# Patient Record
Sex: MALE | Race: WHITE | URBAN - METROPOLITAN AREA
[De-identification: names, ages, dates, MRNs, and addresses within clinical notes are randomized per-mention and may not be internally consistent; named-entity substitution may affect disease eponyms.]

---

## 2023-02-21 ENCOUNTER — OFFICE VISIT (OUTPATIENT)
Dept: CARDIOLOGY CLINIC | Age: 68
End: 2023-02-21
Payer: COMMERCIAL

## 2023-02-21 VITALS
SYSTOLIC BLOOD PRESSURE: 106 MMHG | DIASTOLIC BLOOD PRESSURE: 72 MMHG | RESPIRATION RATE: 14 BRPM | HEART RATE: 64 BPM | BODY MASS INDEX: 30.35 KG/M2 | WEIGHT: 229 LBS | HEIGHT: 73 IN | OXYGEN SATURATION: 96 %

## 2023-02-21 DIAGNOSIS — I25.5 ISCHEMIC CARDIOMYOPATHY: ICD-10-CM

## 2023-02-21 DIAGNOSIS — I25.10 CORONARY ARTERY DISEASE INVOLVING NATIVE HEART, UNSPECIFIED VESSEL OR LESION TYPE, UNSPECIFIED WHETHER ANGINA PRESENT: ICD-10-CM

## 2023-02-21 DIAGNOSIS — I73.9 PAD (PERIPHERAL ARTERY DISEASE) (HCC): ICD-10-CM

## 2023-02-21 DIAGNOSIS — I50.9 CONGESTIVE HEART FAILURE, UNSPECIFIED HF CHRONICITY, UNSPECIFIED HEART FAILURE TYPE (HCC): Primary | ICD-10-CM

## 2023-02-21 DIAGNOSIS — I70.1 RENAL ARTERY STENOSIS (HCC): ICD-10-CM

## 2023-02-21 DIAGNOSIS — I47.20 V-TACH: ICD-10-CM

## 2023-02-21 DIAGNOSIS — Z95.810 BIVENTRICULAR ICD (IMPLANTABLE CARDIOVERTER-DEFIBRILLATOR) IN PLACE: ICD-10-CM

## 2023-02-21 PROCEDURE — 93005 ELECTROCARDIOGRAM TRACING: CPT | Performed by: INTERNAL MEDICINE

## 2023-02-21 PROCEDURE — G8432 DEP SCR NOT DOC, RNG: HCPCS | Performed by: INTERNAL MEDICINE

## 2023-02-21 PROCEDURE — 1101F PT FALLS ASSESS-DOCD LE1/YR: CPT | Performed by: INTERNAL MEDICINE

## 2023-02-21 PROCEDURE — 99205 OFFICE O/P NEW HI 60 MIN: CPT | Performed by: INTERNAL MEDICINE

## 2023-02-21 PROCEDURE — 93010 ELECTROCARDIOGRAM REPORT: CPT | Performed by: INTERNAL MEDICINE

## 2023-02-21 PROCEDURE — G8536 NO DOC ELDER MAL SCRN: HCPCS | Performed by: INTERNAL MEDICINE

## 2023-02-21 PROCEDURE — G0463 HOSPITAL OUTPT CLINIC VISIT: HCPCS | Performed by: INTERNAL MEDICINE

## 2023-02-21 PROCEDURE — 3017F COLORECTAL CA SCREEN DOC REV: CPT | Performed by: INTERNAL MEDICINE

## 2023-02-21 PROCEDURE — G8427 DOCREV CUR MEDS BY ELIG CLIN: HCPCS | Performed by: INTERNAL MEDICINE

## 2023-02-21 PROCEDURE — G8417 CALC BMI ABV UP PARAM F/U: HCPCS | Performed by: INTERNAL MEDICINE

## 2023-02-21 PROCEDURE — 1123F ACP DISCUSS/DSCN MKR DOCD: CPT | Performed by: INTERNAL MEDICINE

## 2023-02-21 RX ORDER — SACUBITRIL AND VALSARTAN 24; 26 MG/1; MG/1
1 TABLET, FILM COATED ORAL 2 TIMES DAILY
Qty: 56 TABLET | Refills: 0 | Status: SHIPPED | COMMUNITY
Start: 2023-02-21

## 2023-02-21 RX ORDER — NITROGLYCERIN 0.4 MG/1
TABLET SUBLINGUAL
COMMUNITY

## 2023-02-21 RX ORDER — CARVEDILOL 25 MG/1
25 TABLET ORAL 2 TIMES DAILY
COMMUNITY
Start: 2022-08-10

## 2023-02-21 RX ORDER — ROSUVASTATIN CALCIUM 40 MG/1
40 TABLET, COATED ORAL
COMMUNITY
Start: 2023-01-02

## 2023-02-21 RX ORDER — ASPIRIN 81 MG/1
81 TABLET ORAL DAILY
COMMUNITY
Start: 2022-03-14

## 2023-02-21 RX ORDER — PAROXETINE HYDROCHLORIDE 20 MG/1
20 TABLET, FILM COATED ORAL DAILY
COMMUNITY
Start: 2023-01-02

## 2023-02-21 RX ORDER — LISINOPRIL 20 MG/1
20 TABLET ORAL DAILY
COMMUNITY
Start: 2023-01-19 | End: 2023-02-21

## 2023-02-21 NOTE — PROGRESS NOTES
Room A 9      Chief Complaint   Patient presents with    CHF    Cardiomyopathy     Visit Vitals  /72 (BP 1 Location: Left upper arm, BP Patient Position: Sitting, BP Cuff Size: Large adult)   Pulse 64   Resp 14   Ht 6' 1\" (1.854 m)   Wt 229 lb (103.9 kg)   SpO2 96%   BMI 30.21 kg/m²     Has SJM BiV ICD, would like to establish with our device clinic    Chest pain: no    Shortness of breath: no    Edema: no    Palpitations, Skipped beats, Rapid heartbeat: no    Dizziness: no    Fatigue:no    New diagnosis/Surgeries: no    1. Have you been to the ER, urgent care clinic since your last visit? Hospitalized since your last visit? NO      2. Have you seen or consulted any other health care providers outside of the [address] [place] [address] since your last visit? Include any pap smears or colon screening.  NO     Refills: NO

## 2023-02-21 NOTE — PROGRESS NOTES
Marlin Rowan MD, MS, [address] [facility]            HISTORY OF PRESENT ILLNESS:    Nava Carranza is a 79 y.o. male here to establish care. Moved to [address] from [place]. Previously followed by Dr. Huy Alaniz,      Coronary artery disease  1994 non-STEMI  2000 CABG LIMA LAD, SVG RCA, SVG OMB  2008? LHC: LIMA to LAD occluded, stent to LAD and CX. (No report available)  10/2017 nuc stress: EF 38%, large partially rev infapical defect c/w MI, mild giacomo-infarct ischemia  12/2018 admission with chest discomfort and ventricular tachycardia  12/2018 LHC: LM nl, LAD 40%, CX irreg, OM1 70% small vessel, %,   LIMA to LAD occluded, SVG %, SVG %. Medical mgnt  2/2020 nuclear stress test: Large fixed defect LAD/RCA, EF 25%. No ischemia  doing well without angina. Continue medical management. Cardiomyopathy  prior EF 20%. No echoes or recent assessment of LV function. 10/2017 Echo: EF 30%, infbasal AK, infseptal HK. PA 21 mmHg  12/2018 Echo: EF 30%, septal thinning, no valvular disease, LVE   2/2020 nuclear stress test: EF 25%  2/2020 echo: EF 30%, inf and apical AK, apical thrombus, CONSTANTIN, no valvular dz  8/2021 echo: EF 27%, apical inferolateral akinesis, no thrombus  doing well without signs or symptoms of CHF. Had been on carvedilol and lisinopril will stop lisinopril and start entresto. Ventricular tachycardia  Previously on amiodarone and mexiletine, stopped 2018 with admission 12/2018 ventricular tachycardia. Mexiletine resumed, ICD parameters changed  2/2020 ventricular tachycardia storm  7/2020 ventricular tachycardia ablation  doing well, asymptomatic, on mexiletine and amiodarone  he would like to continue mexiletine.   Followed by Dr. Angie Pete  Off mexiletine     ICD implant   Initial implant 2008, biventricular ICD 2017  currently doing well     hypertension,   controlled on medications     hyperlipidemia,   stable on crestor 20 mg      Apical thrombus   2/2020 echo: Apical thrombus  8/20/2021 echo: No thrombus  patient seems to be doing well with resolution. Will stop Eliquis and start aspirin. PAD  2008 left iliac stent  2011 right iliac stent  doing well with mild claudication    DOLORES  right renal stent 2006 1/2020 renal duplex: No stenosis  currently doing well with normal blood pressure. Mild renal insufficiency  renal insufficiency with creatinine 1.5    COPD       Total time spent >35 min, reviewing my prior notes, referring physician notes, other subspecialty and primary care notes, all available lab values, all available cardiac testing, all available radiology imaging, vital signs, weights, medications, potential medication interactions, family history, preparing my notes, updating diagnoses, correcting chart errors from other providers, documenting the above, discussing findings/results/testing methodologies/plan with patient, ordering tests/lab, sending prescriptions. SUMMARY:   Problem List  Date Reviewed: 2/21/2023            Codes Class Noted    PAD (peripheral artery disease) ([facility] ICD-10-CM: I73.9  ICD-9-CM: 443.9  2/21/2023        Renal artery stenosis (HCC) ICD-10-CM: I70.1  ICD-9-CM: 440.1  2/21/2023        Coronary artery disease involving native heart ICD-10-CM: I25.10  ICD-9-CM: 414.01  2/21/2023        Congestive heart failure ([facility] ICD-10-CM: I50.9  ICD-9-CM: 428.0  2/21/2023        V-tach ICD-10-CM: A00.77  ICD-9-CM: 427.1  2/21/2023        Biventricular ICD (implantable cardioverter-defibrillator) in place ICD-10-CM: Z95.810  ICD-9-CM: V45.02  2/21/2023        Ischemic cardiomyopathy ICD-10-CM: I25.5  ICD-9-CM: 414.8  2/21/2023           Current Outpatient Medications on File Prior to Visit   Medication Sig    carvediloL (COREG) 25 mg tablet Take 25 mg by mouth two (2) times a day. aspirin delayed-release 81 mg tablet Take 81 mg by mouth daily. rosuvastatin (CRESTOR) 40 mg tablet Take 40 mg by mouth nightly.     nitroglycerin (NITROSTAT) 0.4 mg SL tablet     PARoxetine (PAXIL) 20 mg tablet Take 20 mg by mouth daily. No current facility-administered medications on file prior to visit. CARDIOLOGY STUDIES TO DATE:  No results found for this visit on 02/21/23. CARDIAC ROS:   negative    Past Medical History:   Diagnosis Date    Carotid artery disease ([address]     ICD (implantable cardioverter-defibrillator) in place        Family History   Problem Relation Age of Onset    Coronary Art Dis Mother     Coronary Art Dis Father     Parkinson's Disease Brother        Social History     Socioeconomic History    Marital status:      Spouse name: Not on file    Number of children: Not on file    Years of education: Not on file    Highest education level: Not on file   Occupational History    Not on file   Tobacco Use    Smoking status: Every Day     Packs/day: 0.50     Types: Cigarettes    Smokeless tobacco: Never   Substance and Sexual Activity    Alcohol use: Yes     Comment: socially    Drug use: Not on file    Sexual activity: Not on file   Other Topics Concern    Not on file   Social History Narrative    Not on file     Social Determinants of Health     Financial Resource Strain: Not on file   Food Insecurity: Not on file   Transportation Needs: Not on file   Physical Activity: Not on file   Stress: Not on file   Social Connections: Not on file   Intimate Partner Violence: Not on file   Housing Stability: Not on file        GENERAL ROS:  A comprehensive review of systems was negative except for that written in the HPI.     Visit Vitals  /72 (BP 1 Location: Left upper arm, BP Patient Position: Sitting, BP Cuff Size: Large adult)   Pulse 64   Resp 14   Ht 6' 1\" (1.854 m)   Wt 103.9 kg (229 lb)   SpO2 96%   BMI 30.21 kg/m²     Vitals:    02/21/23 1044   BP: 106/72   Pulse: 64   Resp: 14   SpO2: 96%   Weight: 103.9 kg (229 lb)   Height: 6' 1\" (1.854 m)        Wt Readings from Last 3 Encounters:   02/21/23 103.9 kg (229 lb) BP Readings from Last 3 Encounters:   02/21/23 106/72       PHYSICAL EXAM  General appearance: alert, cooperative, no distress, appears stated age  Neck: supple, symmetrical, trachea midline, no adenopathy, thyroid: not enlarged, symmetric, no tenderness/mass/nodules, no carotid bruit, and no JVD  Lungs: clear to auscultation bilaterally  Heart: regular rate and rhythm, S1, S2 normal, no murmur, click, rub or gallop  Extremities: extremities normal, atraumatic, no cyanosis or edema    No results found for: CHOL, CHOLX, CHLST, CHOLV, 020972, HDL, HDLP, LDL, LDLC, DLDLP, TGLX, TRIGL, TRIGP, CHHD, CHHDX    No results found for: WBC, WBCLT, HGBPOC, HGB, HGBP, HCTPOC, HCT, PHCT, RBCH, PLT, MCV, HGBEXT, HCTEXT, PLTEXT, HGBEXT, HCTEXT, PLTEXT     No results found for: CHOL, CHOLPOCT, CHOLX, CHLST, CHOLV, HDL, HDLP, LDL, LDLC, DLDLP, TGLX, TRIGL, TRIGP, TGLPOCT, NTGLT, CHHD, CHHDX     ASSESSMENT  Diagnoses and all orders for this visit:    1. Congestive heart failure, unspecified HF chronicity, unspecified heart failure type (HCC)  -     AMB POC EKG ROUTINE W/ 12 LEADS, INTER & REP  -     CBC W/O DIFF; Future  -     METABOLIC PANEL, COMPREHENSIVE; Future  -     LIPID PANEL; Future  -     TSH 3RD GENERATION; Future  -     ECHO ADULT COMPLETE; Future  -     NUCLEAR CARDIAC STRESS TEST; Future    2. Coronary artery disease involving native heart, unspecified vessel or lesion type, unspecified whether angina present  -     CBC W/O DIFF; Future  -     METABOLIC PANEL, COMPREHENSIVE; Future  -     LIPID PANEL; Future  -     TSH 3RD GENERATION; Future  -     ECHO ADULT COMPLETE; Future  -     NUCLEAR CARDIAC STRESS TEST; Future    3. V-tach  -     CBC W/O DIFF; Future  -     METABOLIC PANEL, COMPREHENSIVE; Future  -     LIPID PANEL; Future  -     TSH 3RD GENERATION; Future  -     ECHO ADULT COMPLETE; Future  -     NUCLEAR CARDIAC STRESS TEST; Future    4.  Biventricular ICD (implantable cardioverter-defibrillator) in place  - CBC W/O DIFF; Future  -     METABOLIC PANEL, COMPREHENSIVE; Future  -     LIPID PANEL; Future  -     TSH 3RD GENERATION; Future  -     ECHO ADULT COMPLETE; Future  -     NUCLEAR CARDIAC STRESS TEST; Future    5. PAD (peripheral artery disease) (HCC)  -     CBC W/O DIFF; Future  -     METABOLIC PANEL, COMPREHENSIVE; Future  -     LIPID PANEL; Future  -     TSH 3RD GENERATION; Future  -     ECHO ADULT COMPLETE; Future  -     NUCLEAR CARDIAC STRESS TEST; Future    6. Renal artery stenosis (HCC)  -     CBC W/O DIFF; Future  -     METABOLIC PANEL, COMPREHENSIVE; Future  -     LIPID PANEL; Future  -     TSH 3RD GENERATION; Future  -     ECHO ADULT COMPLETE; Future  -     NUCLEAR CARDIAC STRESS TEST; Future    7. Ischemic cardiomyopathy  -     CBC W/O DIFF; Future  -     METABOLIC PANEL, COMPREHENSIVE; Future  -     LIPID PANEL; Future  -     TSH 3RD GENERATION; Future  -     ECHO ADULT COMPLETE; Future  -     NUCLEAR CARDIAC STRESS TEST; Future    Other orders  -     sacubitriL-valsartan (Entresto) 24-26 mg tablet; Take 1 Tablet by mouth two (2) times a day. -     sacubitril-valsartan (ENTRESTO) 24 mg/26 mg tablet; Take 1 Tablet by mouth two (2) times a day. Encounter Diagnoses   Name Primary?     Congestive heart failure, unspecified HF chronicity, unspecified heart failure type ([facility] Yes    Coronary artery disease involving native heart, unspecified vessel or lesion type, unspecified whether angina present     V-tach     Biventricular ICD (implantable cardioverter-defibrillator) in place     PAD (peripheral artery disease) ([facility]     Renal artery stenosis (HCC)     Ischemic cardiomyopathy      Orders Placed This Encounter    CBC W/O DIFF    METABOLIC PANEL, COMPREHENSIVE    LIPID PANEL    TSH 3RD GENERATION    AMB POC EKG ROUTINE W/ 12 LEADS, INTER & REP    DISCONTD: lisinopriL (PRINIVIL, ZESTRIL) 20 mg tablet    carvediloL (COREG) 25 mg tablet    aspirin delayed-release 81 mg tablet    rosuvastatin (CRESTOR) 40 mg tablet    nitroglycerin (NITROSTAT) 0.4 mg SL tablet    PARoxetine (PAXIL) 20 mg tablet    sacubitriL-valsartan (Entresto) 24-26 mg tablet    sacubitril-valsartan (ENTRESTO) 24 mg/26 mg tablet       Plan      Follow-up and Dispositions    Return in about 3 months (around 5/21/2023). Jam Hedrick MD  2/21/2023        [address]   [address]  [facility], [address] [place] [address] Avenue  [phone] (F)    [address] [facility]  [address]  [phone] (P)  [phone] (F)    ATTENTION:   This medical record was transcribed using an electronic medical records/speech recognition system. Although proofread, it may and can contain electronic, spelling and other errors. Corrections may be executed at a later time. Please feel free to contact us for any clarifications as needed.

## 2023-02-21 NOTE — PROGRESS NOTES
Per Dr. Belinda Ragsdale- echo, exercise nuclear stress now and 3 follow up. Labs ordered. Stop Lisinopril now; start Entresto Thursday (one day washout period) Per Dr. Belinda Ragsdale- Patient expressed understanding.

## 2023-03-13 NOTE — TELEPHONE ENCOUNTER
Pt is calling because he needs a refill on his carvedilol. Patient is completely out of his medicine. pharmacy is confirmed.       [phone]

## 2023-03-14 RX ORDER — CARVEDILOL 25 MG/1
25 TABLET ORAL 2 TIMES DAILY
Qty: 180 TABLET | Refills: 3 | Status: SHIPPED | OUTPATIENT
Start: 2023-03-14

## 2023-03-14 NOTE — TELEPHONE ENCOUNTER
Refill per VO of Dr. Deshpande Pap  Last appt: 2/21/2023  Future Appointments   Date Time Provider Shilpa Donaldsoni   3/15/2023  8:00 AM LONA PABLO BS AMB   3/15/2023  9:30 AM LONA WEAVER AMB   3/15/2023  1:40 PM Mikayla Meyer MD CAV BS AMB       Requested Prescriptions     Signed Prescriptions Disp Refills    carvediloL (COREG) 25 mg tablet 180 Tablet 3     Sig: Take 1 Tablet by mouth two (2) times a day.      Authorizing Provider: Lina Mosquera     Ordering User: Carolyn Gaspar

## 2023-03-15 ENCOUNTER — ANCILLARY PROCEDURE (OUTPATIENT)
Dept: CARDIOLOGY CLINIC | Age: 68
End: 2023-03-15
Payer: MEDICARE

## 2023-03-15 ENCOUNTER — OFFICE VISIT (OUTPATIENT)
Dept: CARDIOLOGY CLINIC | Age: 68
End: 2023-03-15
Payer: MEDICARE

## 2023-03-15 VITALS
WEIGHT: 229 LBS | HEIGHT: 73 IN | SYSTOLIC BLOOD PRESSURE: 120 MMHG | OXYGEN SATURATION: 97 % | BODY MASS INDEX: 30.35 KG/M2 | HEART RATE: 64 BPM | DIASTOLIC BLOOD PRESSURE: 80 MMHG

## 2023-03-15 VITALS — WEIGHT: 229 LBS | HEIGHT: 73 IN | BODY MASS INDEX: 30.35 KG/M2

## 2023-03-15 VITALS
DIASTOLIC BLOOD PRESSURE: 72 MMHG | WEIGHT: 221 LBS | BODY MASS INDEX: 29.29 KG/M2 | SYSTOLIC BLOOD PRESSURE: 106 MMHG | HEIGHT: 73 IN

## 2023-03-15 DIAGNOSIS — I25.10 CORONARY ARTERY DISEASE INVOLVING NATIVE HEART, UNSPECIFIED VESSEL OR LESION TYPE, UNSPECIFIED WHETHER ANGINA PRESENT: ICD-10-CM

## 2023-03-15 DIAGNOSIS — I25.5 ISCHEMIC CARDIOMYOPATHY: ICD-10-CM

## 2023-03-15 DIAGNOSIS — I47.20 V-TACH: ICD-10-CM

## 2023-03-15 DIAGNOSIS — I73.9 PAD (PERIPHERAL ARTERY DISEASE) (HCC): ICD-10-CM

## 2023-03-15 DIAGNOSIS — I50.9 CONGESTIVE HEART FAILURE, UNSPECIFIED HF CHRONICITY, UNSPECIFIED HEART FAILURE TYPE (HCC): ICD-10-CM

## 2023-03-15 DIAGNOSIS — I47.20 V-TACH (HCC): ICD-10-CM

## 2023-03-15 DIAGNOSIS — Z95.810 BIVENTRICULAR ICD (IMPLANTABLE CARDIOVERTER-DEFIBRILLATOR) IN PLACE: ICD-10-CM

## 2023-03-15 DIAGNOSIS — I70.1 RENAL ARTERY STENOSIS (HCC): ICD-10-CM

## 2023-03-15 DIAGNOSIS — Z95.810 BIVENTRICULAR ICD (IMPLANTABLE CARDIOVERTER-DEFIBRILLATOR) IN PLACE: Primary | ICD-10-CM

## 2023-03-15 LAB
ECHO AO ASC DIAM: 3.2 CM
ECHO AO ASCENDING AORTA INDEX: 1.4 CM/M2
ECHO AO ROOT DIAM: 3.9 CM
ECHO AO ROOT INDEX: 1.71 CM/M2
ECHO AV PEAK GRADIENT: 3 MMHG
ECHO AV PEAK VELOCITY: 0.8 M/S
ECHO AV VELOCITY RATIO: 0.75
ECHO LA DIAMETER INDEX: 1.67 CM/M2
ECHO LA DIAMETER: 3.8 CM
ECHO LA TO AORTIC ROOT RATIO: 0.97
ECHO LA VOL 2C: 60 ML (ref 18–58)
ECHO LA VOL 4C: 56 ML (ref 18–58)
ECHO LA VOL BP: 60 ML (ref 18–58)
ECHO LA VOL/BSA BIPLANE: 26 ML/M2 (ref 16–34)
ECHO LA VOLUME AREA LENGTH: 62 ML
ECHO LA VOLUME INDEX A2C: 26 ML/M2 (ref 16–34)
ECHO LA VOLUME INDEX A4C: 25 ML/M2 (ref 16–34)
ECHO LA VOLUME INDEX AREA LENGTH: 27 ML/M2 (ref 16–34)
ECHO LV E' LATERAL VELOCITY: 5 CM/S
ECHO LV E' SEPTAL VELOCITY: 4 CM/S
ECHO LV EDV A2C: 157 ML
ECHO LV EDV A4C: 232 ML
ECHO LV EDV BP: 193 ML (ref 67–155)
ECHO LV EDV INDEX A4C: 102 ML/M2
ECHO LV EDV INDEX BP: 85 ML/M2
ECHO LV EDV NDEX A2C: 69 ML/M2
ECHO LV EJECTION FRACTION A2C: 39 %
ECHO LV EJECTION FRACTION A4C: 29 %
ECHO LV EJECTION FRACTION BIPLANE: 31 % (ref 55–100)
ECHO LV ESV A2C: 96 ML
ECHO LV ESV A4C: 164 ML
ECHO LV ESV BP: 133 ML (ref 22–58)
ECHO LV ESV INDEX A2C: 42 ML/M2
ECHO LV ESV INDEX A4C: 72 ML/M2
ECHO LV ESV INDEX BP: 58 ML/M2
ECHO LV FRACTIONAL SHORTENING: 9 % (ref 28–44)
ECHO LV INTERNAL DIMENSION DIASTOLE INDEX: 2.46 CM/M2
ECHO LV INTERNAL DIMENSION DIASTOLIC: 5.6 CM (ref 4.2–5.9)
ECHO LV INTERNAL DIMENSION SYSTOLIC INDEX: 2.24 CM/M2
ECHO LV INTERNAL DIMENSION SYSTOLIC: 5.1 CM
ECHO LV IVSD: 1.9 CM (ref 0.6–1)
ECHO LV MASS 2D: 461 G (ref 88–224)
ECHO LV MASS INDEX 2D: 202.2 G/M2 (ref 49–115)
ECHO LV POSTERIOR WALL DIASTOLIC: 1.5 CM (ref 0.6–1)
ECHO LV RELATIVE WALL THICKNESS RATIO: 0.54
ECHO LVOT PEAK GRADIENT: 2 MMHG
ECHO LVOT PEAK VELOCITY: 0.6 M/S
ECHO MV A VELOCITY: 0.89 M/S
ECHO MV AREA PHT: 2.4 CM2
ECHO MV E DECELERATION TIME (DT): 320.1 MS
ECHO MV E VELOCITY: 0.61 M/S
ECHO MV E/A RATIO: 0.69
ECHO MV E/E' LATERAL: 12.2
ECHO MV E/E' RATIO (AVERAGED): 13.73
ECHO MV E/E' SEPTAL: 15.25
ECHO MV PRESSURE HALF TIME (PHT): 92.8 MS
ECHO RA AREA 4C: 21.8 CM2
ECHO RA END SYSTOLIC VOLUME APICAL 4 CHAMBER INDEX BSA: 30 ML/M2
ECHO RA VOLUME AREA LENGTH APICAL 4 CHAMBER: 75 ML
ECHO RA VOLUME: 68 ML
ECHO RV FREE WALL PEAK S': 7 CM/S
ECHO RV INTERNAL DIMENSION: 3.5 CM
ECHO RV TAPSE: 2 CM (ref 1.7–?)
ECHO TV REGURGITANT MAX VELOCITY: 1.89 M/S
ECHO TV REGURGITANT PEAK GRADIENT: 14 MMHG
NUC STRESS EJECTION FRACTION: 18 %
STRESS BASELINE DIAS BP: 82 MMHG
STRESS BASELINE HR: 60 BPM
STRESS BASELINE SYS BP: 126 MMHG
STRESS O2 SAT PEAK: 99 %
STRESS O2 SAT REST: 99 %
STRESS PEAK DIAS BP: 62 MMHG
STRESS PEAK SYS BP: 90 MMHG
STRESS PERCENT HR ACHIEVED: 44 %
STRESS POST PEAK HR: 68 BPM
STRESS RATE PRESSURE PRODUCT: 6120 BPM*MMHG
STRESS TARGET HR: 153 BPM
TID: 1.13

## 2023-03-15 PROCEDURE — 93017 CV STRESS TEST TRACING ONLY: CPT | Performed by: INTERNAL MEDICINE

## 2023-03-15 PROCEDURE — G0463 HOSPITAL OUTPT CLINIC VISIT: HCPCS | Performed by: INTERNAL MEDICINE

## 2023-03-15 PROCEDURE — 93016 CV STRESS TEST SUPVJ ONLY: CPT | Performed by: INTERNAL MEDICINE

## 2023-03-15 PROCEDURE — 78452 HT MUSCLE IMAGE SPECT MULT: CPT | Performed by: INTERNAL MEDICINE

## 2023-03-15 PROCEDURE — A9500 TC99M SESTAMIBI: HCPCS | Performed by: INTERNAL MEDICINE

## 2023-03-15 PROCEDURE — 93018 CV STRESS TEST I&R ONLY: CPT | Performed by: INTERNAL MEDICINE

## 2023-03-15 PROCEDURE — 93306 TTE W/DOPPLER COMPLETE: CPT | Performed by: INTERNAL MEDICINE

## 2023-03-15 RX ORDER — TETRAKIS(2-METHOXYISOBUTYLISOCYANIDE)COPPER(I) TETRAFLUOROBORATE 1 MG/ML
25.3 INJECTION, POWDER, LYOPHILIZED, FOR SOLUTION INTRAVENOUS ONCE
Status: COMPLETED | OUTPATIENT
Start: 2023-03-15 | End: 2023-03-15

## 2023-03-15 RX ORDER — TETRAKIS(2-METHOXYISOBUTYLISOCYANIDE)COPPER(I) TETRAFLUOROBORATE 1 MG/ML
8.7 INJECTION, POWDER, LYOPHILIZED, FOR SOLUTION INTRAVENOUS ONCE
Status: COMPLETED | OUTPATIENT
Start: 2023-03-15 | End: 2023-03-15

## 2023-03-15 RX ADMIN — REGADENOSON 0.4 MG: 0.08 INJECTION, SOLUTION INTRAVENOUS at 10:38

## 2023-03-15 RX ADMIN — TECHNETIUM TC 99M SESTAMIBI 25.3 MILLICURIE: 1 INJECTION, POWDER, FOR SOLUTION INTRAVENOUS at 10:30

## 2023-03-15 RX ADMIN — TECHNETIUM TC 99M SESTAMIBI 8.7 MILLICURIE: 1 INJECTION, POWDER, FOR SOLUTION INTRAVENOUS at 09:15

## 2023-03-15 NOTE — PROGRESS NOTES
Karissa Garrido MD, MS, [address] [facility]            HISTORY OF PRESENT ILLNESS:    Tamiko Ellis is a 79 y.o. male here to for FU. Moved to [address] from [place]. Previously followed by Dr. Femi Vieira. Echo and pharmacologic nuc stress done prior to his appt - EF 20-25%, inferior apical AK. No thrombus. Nuc stress large fixed inferior apical defect with no reversibility, EF 18%. Coronary artery disease  1994 non-STEMI  2000 CABG LIMA LAD, SVG RCA, SVG OMB  2008? LHC: LIMA to LAD occluded, stent to LAD and CX. (No report available)  10/2017 nuc stress: EF 38%, large partially rev infapical defect c/w MI, mild giacomo-infarct ischemia  12/2018 admission with chest discomfort and ventricular tachycardia  12/2018 LHC: LM nl, LAD 40%, CX irreg, OM1 70% small vessel, %,   LIMA to LAD occluded, SVG %, SVG %. Medical mgnt  2/2020 nuclear stress test: Large fixed defect LAD/RCA, EF 25%. No ischemia  doing well without angina. Continue medical management. 3/2023 pharma nuc arge fixed inferior apical defect with no reversibility, EF 18%. Cardiomyopathy  prior EF 20%. No echoes or recent assessment of LV function. 10/2017 Echo: EF 30%, infbasal AK, infseptal HK. PA 21 mmHg  12/2018 Echo: EF 30%, septal thinning, no valvular disease, LVE   2/2020 nuclear stress test: EF 25%  2/2020 echo: EF 30%, inf and apical AK, apical thrombus, CONSTANTIN, no valvular dz  8/2021 echo: EF 27%, apical inferolateral akinesis, no thrombus  doing well without signs or symptoms of CHF. Had been on carvedilol and lisinopril will stop lisinopril and start entresto. 3/2023 EF 20-25%, inferior apical AK. No thrombus. Ventricular tachycardia  Previously on amiodarone and mexiletine, stopped 2018 with admission 12/2018 ventricular tachycardia.  Mexiletine resumed, ICD parameters changed  2/2020 ventricular tachycardia storm  7/2020 ventricular tachycardia ablation  doing well, asymptomatic, on mexiletine and amiodarone  he would like to continue mexiletine. Followed by Dr. Anna Palma  Off mexiletine     ICD implant   Initial implant 2008, biventricular ICD 2017  currently doing well     hypertension,   controlled on medications     hyperlipidemia,   stable on crestor 20 mg      Apical thrombus   2/2020 echo: Apical thrombus  8/20/2021 echo: No thrombus  patient seems to be doing well with resolution. Will stop Eliquis and start aspirin. PAD  2008 left iliac stent  2011 right iliac stent  doing well with mild claudication    DOLORES  right renal stent 2006 1/2020 renal duplex: No stenosis  currently doing well with normal blood pressure. Mild renal insufficiency  renal insufficiency with creatinine 1.5    COPD      REFER TO DEVICE CLINIC, PCP. SUMMARY:   Problem List  Date Reviewed: 3/15/2023            Codes Class Noted    PAD (peripheral artery disease) ([facility] ICD-10-CM: I73.9  ICD-9-CM: 443.9  2/21/2023        Renal artery stenosis (HCC) ICD-10-CM: I70.1  ICD-9-CM: 440.1  2/21/2023        Coronary artery disease involving native heart ICD-10-CM: I25.10  ICD-9-CM: 414.01  2/21/2023        Congestive heart failure ([facility] ICD-10-CM: I50.9  ICD-9-CM: 428.0  2/21/2023        V-tach ICD-10-CM: H46.59  ICD-9-CM: 427.1  2/21/2023        Biventricular ICD (implantable cardioverter-defibrillator) in place ICD-10-CM: Z95.810  ICD-9-CM: V45.02  2/21/2023        Ischemic cardiomyopathy ICD-10-CM: I25.5  ICD-9-CM: 414.8  2/21/2023           Current Outpatient Medications on File Prior to Visit   Medication Sig    carvediloL (COREG) 25 mg tablet Take 1 Tablet by mouth two (2) times a day. aspirin delayed-release 81 mg tablet Take 81 mg by mouth daily. rosuvastatin (CRESTOR) 40 mg tablet Take 40 mg by mouth nightly. nitroglycerin (NITROSTAT) 0.4 mg SL tablet     PARoxetine (PAXIL) 20 mg tablet Take 20 mg by mouth daily. sacubitril-valsartan (ENTRESTO) 24 mg/26 mg tablet Take 1 Tablet by mouth two (2) times a day. Current Facility-Administered Medications on File Prior to Visit   Medication    [COMPLETED] regadenoson (LEXISCAN) injection 0.4 mg    [COMPLETED] technetium sestamibi TC 99M (CARDIOLITE) injection 8.7 millicurie    [COMPLETED] technetium sestamibi TC 99M (CARDIOLITE) injection 46.7 millicurie            CARDIOLOGY STUDIES TO DATE:  No results found for this visit on 03/15/23. CARDIAC ROS:   negative    Past Medical History:   Diagnosis Date    Carotid artery disease ([facility]     ICD (implantable cardioverter-defibrillator) in place        Family History   Problem Relation Age of Onset    Coronary Art Dis Mother     Coronary Art Dis Father     Parkinson's Disease Brother        Social History     Socioeconomic History    Marital status:      Spouse name: Not on file    Number of children: Not on file    Years of education: Not on file    Highest education level: Not on file   Occupational History    Not on file   Tobacco Use    Smoking status: Every Day     Packs/day: 0.50     Types: Cigarettes    Smokeless tobacco: Never   Substance and Sexual Activity    Alcohol use: Yes     Comment: socially    Drug use: Not on file    Sexual activity: Not on file   Other Topics Concern    Not on file   Social History Narrative    Not on file     Social Determinants of Health     Financial Resource Strain: Not on file   Food Insecurity: Not on file   Transportation Needs: Not on file   Physical Activity: Not on file   Stress: Not on file   Social Connections: Not on file   Intimate Partner Violence: Not on file   Housing Stability: Not on file        GENERAL ROS:  A comprehensive review of systems was negative except for that written in the HPI.     Visit Vitals  /80 (BP 1 Location: Left upper arm, BP Patient Position: Sitting, BP Cuff Size: Adult)   Pulse 64   Ht 6' 1\" (1.854 m)   Wt 103.9 kg (229 lb)   SpO2 97%   BMI 30.21 kg/m²     Vitals:    03/15/23 1327   BP: 120/80   Pulse: 64   SpO2: 97% Weight: 103.9 kg (229 lb)   Height: 6' 1\" (1.854 m)        Wt Readings from Last 3 Encounters:   03/15/23 103.9 kg (229 lb)   03/15/23 103.9 kg (229 lb)   03/15/23 100.2 kg (221 lb)            BP Readings from Last 3 Encounters:   03/15/23 120/80   03/15/23 106/72   02/21/23 106/72       PHYSICAL EXAM  General appearance: alert, cooperative, no distress, appears stated age  Neck: supple, symmetrical, trachea midline, no adenopathy, thyroid: not enlarged, symmetric, no tenderness/mass/nodules, no carotid bruit, and no JVD  Lungs: clear to auscultation bilaterally  Heart: regular rate and rhythm, S1, S2 normal, no murmur, click, rub or gallop  Extremities: extremities normal, atraumatic, no cyanosis or edema    Lab Results   Component Value Date/Time    Cholesterol, total 131 02/21/2023 11:44 AM    HDL Cholesterol 31 02/21/2023 11:44 AM    LDL, calculated 58.2 02/21/2023 11:44 AM    Triglyceride 209 (H) 02/21/2023 11:44 AM    CHOL/HDL Ratio 4.2 02/21/2023 11:44 AM       Lab Results   Component Value Date/Time    WBC 8.6 02/21/2023 11:44 AM    HGB 16.2 02/21/2023 11:44 AM    HCT 52.2 (H) 02/21/2023 11:44 AM    PLATELET  06/58/4468 11:44 AM     UNABLE TO REPORT ACCURATE COUNT DUE TO PLATELET AGGREGATION, HOWEVER, PLATELETS APPEAR DECREASED IN NUMBER ON SMEAR. PLEASE RESUBMIT SODIUM CITRATE (BLUE) AND EDTA (LAVENDAR) TUBES FOR HEMATOLOGICAL TESTING. MCV 98.1 02/21/2023 11:44 AM        Lab Results   Component Value Date/Time    Cholesterol, total 131 02/21/2023 11:44 AM    HDL Cholesterol 31 02/21/2023 11:44 AM    LDL, calculated 58.2 02/21/2023 11:44 AM    Triglyceride 209 (H) 02/21/2023 11:44 AM    CHOL/HDL Ratio 4.2 02/21/2023 11:44 AM        ASSESSMENT  Diagnoses and all orders for this visit:    1. Biventricular ICD (implantable cardioverter-defibrillator) in place    2. Ischemic cardiomyopathy    3. Congestive heart failure, unspecified HF chronicity, unspecified heart failure type ([facility]    4.  Renal artery stenosis ([facility]    5. PAD (peripheral artery disease) ([facility]    6. V-tach    7. Coronary artery disease involving native heart, unspecified vessel or lesion type, unspecified whether angina present         Encounter Diagnoses   Name Primary? Biventricular ICD (implantable cardioverter-defibrillator) in place Yes    Ischemic cardiomyopathy     Congestive heart failure, unspecified HF chronicity, unspecified heart failure type ([facility]     Renal artery stenosis (HCC)     PAD (peripheral artery disease) ([facility]     V-tach     Coronary artery disease involving native heart, unspecified vessel or lesion type, unspecified whether angina present        No orders of the defined types were placed in this encounter. Lucas Glez MD  3/15/2023        [address]   [address] [facility], [address] [place] [address]  [phone] (F)    [address]  [phone] (P)  [phone] (F)    ATTENTION:   This medical record was transcribed using an electronic medical records/speech recognition system. Although proofread, it may and can contain electronic, spelling and other errors. Corrections may be executed at a later time. Please feel free to contact us for any clarifications as needed.

## 2023-03-15 NOTE — PROGRESS NOTES
Sindy Mccallum is a 79 y.o. male    There were no vitals filed for this visit. Chief Complaint   Patient presents with    CHF    Coronary Artery Disease    Irregular Heart Beat     PAD    Cardiomyopathy    Other     TACHYCARDIA       Chest pain NO  SOB NO  Dizziness NO  Swelling NO  Recent hospital visit NO  Refills NITROGLYCERIN  COVID VACCINE YES  HAD COVID?  YES    PT WANTS TO TALK ABOUT ENTRESTO

## 2023-03-15 NOTE — PROGRESS NOTES
Per Dr. Lyndel Canavan- samples of entresto with 30 day free trail offer, patient assistance forms, EP new patient appt for ICD; and 3 month follow up with Lyndel Canavan.

## 2023-03-20 ENCOUNTER — OFFICE VISIT (OUTPATIENT)
Dept: CARDIOLOGY CLINIC | Age: 68
End: 2023-03-20
Payer: MEDICARE

## 2023-03-20 DIAGNOSIS — Z95.810 BIVENTRICULAR ICD (IMPLANTABLE CARDIOVERTER-DEFIBRILLATOR) IN PLACE: Primary | ICD-10-CM

## 2023-03-20 PROCEDURE — 93284 PRGRMG EVAL IMPLANTABLE DFB: CPT | Performed by: INTERNAL MEDICINE

## 2023-03-20 PROCEDURE — 93289 INTERROG DEVICE EVAL HEART: CPT | Performed by: INTERNAL MEDICINE

## 2023-03-20 NOTE — PROGRESS NOTES
C/ new to clinic pacer ck/thresholds  Device functioning appropriately as programmed. Atrial noise noted as well as some NSVT falling into VT-2 zone, no therapies initialed or delivered.    See scanned documents

## 2023-06-01 ENCOUNTER — TELEPHONE (OUTPATIENT)
Age: 68
End: 2023-06-01

## 2023-06-01 RX ORDER — ROSUVASTATIN CALCIUM 40 MG/1
40 TABLET, COATED ORAL NIGHTLY
Qty: 90 TABLET | Refills: 3 | Status: SHIPPED | OUTPATIENT
Start: 2023-06-01

## 2023-06-01 NOTE — TELEPHONE ENCOUNTER
Per verbal order from Dr. Chari Echeverria  Last appt: Visit date not found     Future Appointments   Date Time Provider Carlyle Aiken   6/2/2023  8:40 AM MD HERMES Alfonso AMB   6/19/2023  9:00 AM MD HERMES Youngblood AMB       Requested Prescriptions     Signed Prescriptions Disp Refills    rosuvastatin (CRESTOR) 40 MG tablet 90 tablet 3     Sig: Take 1 tablet by mouth nightly     Authorizing Provider: Toan Mckenzie     Ordering User: Heber Evans

## 2023-06-02 ENCOUNTER — OFFICE VISIT (OUTPATIENT)
Age: 68
End: 2023-06-02
Payer: MEDICARE

## 2023-06-02 VITALS
HEART RATE: 64 BPM | SYSTOLIC BLOOD PRESSURE: 100 MMHG | WEIGHT: 227 LBS | RESPIRATION RATE: 14 BRPM | OXYGEN SATURATION: 95 % | BODY MASS INDEX: 30.09 KG/M2 | DIASTOLIC BLOOD PRESSURE: 62 MMHG | HEIGHT: 73 IN

## 2023-06-02 DIAGNOSIS — I70.1 RENAL ARTERY STENOSIS (HCC): ICD-10-CM

## 2023-06-02 DIAGNOSIS — I50.22 CHRONIC SYSTOLIC CONGESTIVE HEART FAILURE (HCC): ICD-10-CM

## 2023-06-02 DIAGNOSIS — I25.5 ISCHEMIC CARDIOMYOPATHY: ICD-10-CM

## 2023-06-02 DIAGNOSIS — I73.9 PAD (PERIPHERAL ARTERY DISEASE) (HCC): ICD-10-CM

## 2023-06-02 DIAGNOSIS — Z95.810 BIVENTRICULAR ICD (IMPLANTABLE CARDIOVERTER-DEFIBRILLATOR) IN PLACE: ICD-10-CM

## 2023-06-02 DIAGNOSIS — I47.20 V-TACH (HCC): Primary | ICD-10-CM

## 2023-06-02 DIAGNOSIS — I25.10 CORONARY ARTERY DISEASE INVOLVING NATIVE HEART WITHOUT ANGINA PECTORIS, UNSPECIFIED VESSEL OR LESION TYPE: ICD-10-CM

## 2023-06-02 PROCEDURE — 99204 OFFICE O/P NEW MOD 45 MIN: CPT | Performed by: INTERNAL MEDICINE

## 2023-06-02 PROCEDURE — G8419 CALC BMI OUT NRM PARAM NOF/U: HCPCS | Performed by: INTERNAL MEDICINE

## 2023-06-02 PROCEDURE — 3017F COLORECTAL CA SCREEN DOC REV: CPT | Performed by: INTERNAL MEDICINE

## 2023-06-02 PROCEDURE — 4004F PT TOBACCO SCREEN RCVD TLK: CPT | Performed by: INTERNAL MEDICINE

## 2023-06-02 PROCEDURE — G8427 DOCREV CUR MEDS BY ELIG CLIN: HCPCS | Performed by: INTERNAL MEDICINE

## 2023-06-02 PROCEDURE — 1123F ACP DISCUSS/DSCN MKR DOCD: CPT | Performed by: INTERNAL MEDICINE

## 2023-06-02 NOTE — PROGRESS NOTES
Room #: EP 2    Chief Complaint   Patient presents with    Cardiomyopathy    Other     VT     SJM BiV ICD last check 5/31/23    /62 (Site: Left Upper Arm, Position: Sitting, Cuff Size: Large Adult)   Pulse 64   Resp 14   Ht 6' 1\" (1.854 m)   Wt 227 lb (103 kg)   SpO2 95%   BMI 29.95 kg/m²       Chest pain:  NO  Shortness of breath:  NO  Edema: NO  Palpitations, skipped beats, rapid heartbeat:  NO  Dizziness:  NO  Fatigue: NO     1. Have you been to the ER, urgent care clinic since your last visit? Hospitalized since your last visit? NO    2. Have you seen or consulted any other health care providers outside of the [address] [place] [address] since your last visit? Include any pap smears or colon screening.  NO      Refills:  NO

## 2023-06-02 NOTE — PROGRESS NOTES
Cardiac Electrophysiology OFFICE Consultation Note     Primary Cardiologist: Dr. Renny Suh    Assessment/Plan:   1. V-tach ([facility]  2. Biventricular ICD (implantable cardioverter-defibrillator) in place  3. Ischemic cardiomyopathy  4. Chronic systolic congestive heart failure ([facility]  5. Coronary artery disease involving native heart without angina pectoris, unspecified vessel or lesion type  6. Renal artery stenosis ([facility]  7. PAD (peripheral artery disease) (HCC)          Ventricular tachycardia  Previously on amiodarone and mexiletine, stopped  2018 with admission 12/2018 ventricular tachycardia. Mexiletine resumed, ICD parameters changed  2/2020 ventricular tachycardia storm  7/2020 ventricular tachycardia ablation  Previously on mexiletine and amiodarone  No recurrent sustained VT or ICD shock now off of AAD  - normal interrogation as noted below  - cont Carvedilol  - remote tranmission every 3 months  - monthly thoracic impedance transmission  - FU with EP NP in 1 year and with me in 18 months      ICD implant   Initial implant 2008, biventricular ICD 2017  [employer]. CRT-D with normal function. Battery life 2 years. No new or significant lead issues requiring intervention. No significant arrhythmias noted requiring intervention. Iterative programing was performed to assess lead parameters without any permanent changes. Effective Biventricular pacing 95%. AP 57%. AF burden <1%. Coronary artery disease  1994 non-STEMI  2000 CABG LIMA LAD, SVG RCA, SVG OMB  2008? LHC: LIMA to LAD occluded, stent to LAD and CX. (No report available)   10/2017 nuc stress: EF 38%, large partially rev infapical defect c/w MI, mild brady-infarct ischemia  12/2018 admission with chest discomfort and ventricular tachycardia  12/2018 LHC: LM nl, LAD 40%, CX irreg, OM1 70% small vessel, %,    LIMA to LAD occluded, SVG %, SVG %. Medical mgnt  2/2020 nuclear stress test: Large fixed defect LAD/RCA, EF 25%.   No

## 2023-06-02 NOTE — PATIENT INSTRUCTIONS
Schedule remote transmission every 3 months  Monthly [place] transmission  Fu with NP in 1 year  FU with Dr. Hall Persons 18 months

## 2023-06-15 ENCOUNTER — NURSE ONLY (OUTPATIENT)
Age: 68
End: 2023-06-15
Payer: MEDICARE

## 2023-06-15 DIAGNOSIS — Z95.810 BIVENTRICULAR ICD (IMPLANTABLE CARDIOVERTER-DEFIBRILLATOR) IN PLACE: Primary | ICD-10-CM

## 2023-06-15 PROCEDURE — 93296 REM INTERROG EVL PM/IDS: CPT | Performed by: INTERNAL MEDICINE

## 2023-06-15 PROCEDURE — PBSHW SINGLE,DUAL, MULTIPLE LEAD PACEMAKER SYST OR IMPL: Performed by: INTERNAL MEDICINE

## 2023-06-19 ENCOUNTER — OFFICE VISIT (OUTPATIENT)
Age: 68
End: 2023-06-19
Payer: MEDICARE

## 2023-06-19 VITALS
SYSTOLIC BLOOD PRESSURE: 134 MMHG | WEIGHT: 227 LBS | HEIGHT: 73 IN | DIASTOLIC BLOOD PRESSURE: 80 MMHG | BODY MASS INDEX: 30.09 KG/M2 | HEART RATE: 64 BPM | OXYGEN SATURATION: 95 %

## 2023-06-19 DIAGNOSIS — I25.10 CORONARY ARTERY DISEASE INVOLVING NATIVE HEART WITHOUT ANGINA PECTORIS, UNSPECIFIED VESSEL OR LESION TYPE: ICD-10-CM

## 2023-06-19 DIAGNOSIS — Z95.810 BIVENTRICULAR ICD (IMPLANTABLE CARDIOVERTER-DEFIBRILLATOR) IN PLACE: ICD-10-CM

## 2023-06-19 DIAGNOSIS — I50.22 CHRONIC SYSTOLIC CONGESTIVE HEART FAILURE (HCC): ICD-10-CM

## 2023-06-19 DIAGNOSIS — I70.1 RENAL ARTERY STENOSIS (HCC): ICD-10-CM

## 2023-06-19 DIAGNOSIS — I25.5 ISCHEMIC CARDIOMYOPATHY: Primary | ICD-10-CM

## 2023-06-19 DIAGNOSIS — I73.9 PAD (PERIPHERAL ARTERY DISEASE) (HCC): ICD-10-CM

## 2023-06-19 DIAGNOSIS — I47.20 V-TACH (HCC): ICD-10-CM

## 2023-06-19 PROCEDURE — 99214 OFFICE O/P EST MOD 30 MIN: CPT | Performed by: INTERNAL MEDICINE

## 2023-06-30 ASSESSMENT — ENCOUNTER SYMPTOMS
CHEST TIGHTNESS: 0
SHORTNESS OF BREATH: 0
WHEEZING: 0

## 2023-07-27 ENCOUNTER — APPOINTMENT (OUTPATIENT)
Dept: VASCULAR SURGERY | Facility: HOSPITAL | Age: 68
DRG: 176 | End: 2023-07-27
Payer: MEDICARE

## 2023-07-27 ENCOUNTER — APPOINTMENT (OUTPATIENT)
Facility: HOSPITAL | Age: 68
DRG: 176 | End: 2023-07-27
Payer: MEDICARE

## 2023-07-27 ENCOUNTER — HOSPITAL ENCOUNTER (OUTPATIENT)
Facility: HOSPITAL | Age: 68
Setting detail: OBSERVATION
LOS: 1 days | Discharge: HOME | DRG: 176 | End: 2023-07-28
Attending: STUDENT IN AN ORGANIZED HEALTH CARE EDUCATION/TRAINING PROGRAM | Admitting: STUDENT IN AN ORGANIZED HEALTH CARE EDUCATION/TRAINING PROGRAM
Payer: MEDICARE

## 2023-07-27 DIAGNOSIS — I51.7 CARDIOMEGALY: ICD-10-CM

## 2023-07-27 DIAGNOSIS — I82.451 DEEP VENOUS THROMBOSIS (DVT) OF RIGHT PERONEAL VEIN, UNSPECIFIED CHRONICITY (HCC): ICD-10-CM

## 2023-07-27 DIAGNOSIS — J98.11 MILD BIBASILAR ATELECTASIS: ICD-10-CM

## 2023-07-27 DIAGNOSIS — I26.99 PULMONARY EMBOLISM, UNSPECIFIED CHRONICITY, UNSPECIFIED PULMONARY EMBOLISM TYPE, UNSPECIFIED WHETHER ACUTE COR PULMONALE PRESENT (HCC): Primary | ICD-10-CM

## 2023-07-27 DIAGNOSIS — I26.99 PULMONARY EMBOLISM, BILATERAL (HCC): ICD-10-CM

## 2023-07-27 LAB
ALBUMIN SERPL-MCNC: 3.2 G/DL (ref 3.5–5)
ALBUMIN/GLOB SERPL: 0.7 (ref 1.1–2.2)
ALP SERPL-CCNC: 73 U/L (ref 45–117)
ALT SERPL-CCNC: 15 U/L (ref 12–78)
ANION GAP SERPL CALC-SCNC: 3 MMOL/L (ref 5–15)
AST SERPL-CCNC: 13 U/L (ref 15–37)
BASOPHILS # BLD: 0.1 K/UL (ref 0–0.1)
BASOPHILS NFR BLD: 1 % (ref 0–1)
BILIRUB SERPL-MCNC: 0.6 MG/DL (ref 0.2–1)
BUN SERPL-MCNC: 20 MG/DL (ref 6–20)
BUN/CREAT SERPL: 13 (ref 12–20)
CALCIUM SERPL-MCNC: 9.7 MG/DL (ref 8.5–10.1)
CHLORIDE SERPL-SCNC: 114 MMOL/L (ref 97–108)
CO2 SERPL-SCNC: 25 MMOL/L (ref 21–32)
COMMENT:: NORMAL
CREAT SERPL-MCNC: 1.57 MG/DL (ref 0.7–1.3)
D DIMER PPP FEU-MCNC: 12.08 MG/L FEU (ref 0–0.65)
DIFFERENTIAL METHOD BLD: NORMAL
EOSINOPHIL # BLD: 0.4 K/UL (ref 0–0.4)
EOSINOPHIL NFR BLD: 4 % (ref 0–7)
ERYTHROCYTE [DISTWIDTH] IN BLOOD BY AUTOMATED COUNT: 14.2 % (ref 11.5–14.5)
GLOBULIN SER CALC-MCNC: 4.3 G/DL (ref 2–4)
GLUCOSE SERPL-MCNC: 140 MG/DL (ref 65–100)
HCT VFR BLD AUTO: 45.3 % (ref 36.6–50.3)
HGB BLD-MCNC: 15 G/DL (ref 12.1–17)
IMM GRANULOCYTES # BLD AUTO: 0 K/UL (ref 0–0.04)
IMM GRANULOCYTES NFR BLD AUTO: 0 % (ref 0–0.5)
LYMPHOCYTES # BLD: 1.8 K/UL (ref 0.8–3.5)
LYMPHOCYTES NFR BLD: 18 % (ref 12–49)
MCH RBC QN AUTO: 31.3 PG (ref 26–34)
MCHC RBC AUTO-ENTMCNC: 33.1 G/DL (ref 30–36.5)
MCV RBC AUTO: 94.6 FL (ref 80–99)
MONOCYTES # BLD: 0.6 K/UL (ref 0–1)
MONOCYTES NFR BLD: 6 % (ref 5–13)
NEUTS SEG # BLD: 7.2 K/UL (ref 1.8–8)
NEUTS SEG NFR BLD: 71 % (ref 32–75)
NRBC # BLD: 0 K/UL (ref 0–0.01)
NRBC BLD-RTO: 0 PER 100 WBC
NT PRO BNP: 946 PG/ML
PLATELET # BLD AUTO: 166 K/UL (ref 150–400)
PMV BLD AUTO: 11.8 FL (ref 8.9–12.9)
POTASSIUM SERPL-SCNC: 3.8 MMOL/L (ref 3.5–5.1)
PROT SERPL-MCNC: 7.5 G/DL (ref 6.4–8.2)
RBC # BLD AUTO: 4.79 M/UL (ref 4.1–5.7)
SODIUM SERPL-SCNC: 142 MMOL/L (ref 136–145)
SPECIMEN HOLD: NORMAL
TROPONIN I SERPL HS-MCNC: 21 NG/L (ref 0–76)
TROPONIN I SERPL HS-MCNC: 27 NG/L (ref 0–76)
WBC # BLD AUTO: 10.2 K/UL (ref 4.1–11.1)

## 2023-07-27 PROCEDURE — G0378 HOSPITAL OBSERVATION PER HR: HCPCS

## 2023-07-27 PROCEDURE — 85025 COMPLETE CBC W/AUTO DIFF WBC: CPT

## 2023-07-27 PROCEDURE — 83880 ASSAY OF NATRIURETIC PEPTIDE: CPT

## 2023-07-27 PROCEDURE — 71275 CT ANGIOGRAPHY CHEST: CPT

## 2023-07-27 PROCEDURE — 1100000000 HC RM PRIVATE

## 2023-07-27 PROCEDURE — 93971 EXTREMITY STUDY: CPT

## 2023-07-27 PROCEDURE — 2580000003 HC RX 258: Performed by: STUDENT IN AN ORGANIZED HEALTH CARE EDUCATION/TRAINING PROGRAM

## 2023-07-27 PROCEDURE — 84484 ASSAY OF TROPONIN QUANT: CPT

## 2023-07-27 PROCEDURE — 36415 COLL VENOUS BLD VENIPUNCTURE: CPT

## 2023-07-27 PROCEDURE — 6360000004 HC RX CONTRAST MEDICATION

## 2023-07-27 PROCEDURE — 80053 COMPREHEN METABOLIC PANEL: CPT

## 2023-07-27 PROCEDURE — 99285 EMERGENCY DEPT VISIT HI MDM: CPT

## 2023-07-27 PROCEDURE — 85379 FIBRIN DEGRADATION QUANT: CPT

## 2023-07-27 PROCEDURE — 93005 ELECTROCARDIOGRAM TRACING: CPT | Performed by: HOSPITALIST

## 2023-07-27 PROCEDURE — 87635 SARS-COV-2 COVID-19 AMP PRB: CPT

## 2023-07-27 PROCEDURE — 6370000000 HC RX 637 (ALT 250 FOR IP): Performed by: STUDENT IN AN ORGANIZED HEALTH CARE EDUCATION/TRAINING PROGRAM

## 2023-07-27 PROCEDURE — 71046 X-RAY EXAM CHEST 2 VIEWS: CPT

## 2023-07-27 RX ORDER — TRAMADOL HYDROCHLORIDE 50 MG/1
50 TABLET ORAL EVERY 6 HOURS PRN
Status: DISCONTINUED | OUTPATIENT
Start: 2023-07-27 | End: 2023-07-28 | Stop reason: HOSPADM

## 2023-07-27 RX ORDER — ASPIRIN 81 MG/1
81 TABLET ORAL DAILY
Status: DISCONTINUED | OUTPATIENT
Start: 2023-07-28 | End: 2023-07-28 | Stop reason: HOSPADM

## 2023-07-27 RX ORDER — CARVEDILOL 12.5 MG/1
25 TABLET ORAL 2 TIMES DAILY WITH MEALS
Status: DISCONTINUED | OUTPATIENT
Start: 2023-07-27 | End: 2023-07-28 | Stop reason: HOSPADM

## 2023-07-27 RX ORDER — ONDANSETRON 2 MG/ML
4 INJECTION INTRAMUSCULAR; INTRAVENOUS EVERY 6 HOURS PRN
Status: DISCONTINUED | OUTPATIENT
Start: 2023-07-27 | End: 2023-07-28 | Stop reason: HOSPADM

## 2023-07-27 RX ORDER — ROSUVASTATIN CALCIUM 10 MG/1
40 TABLET, COATED ORAL NIGHTLY
Status: DISCONTINUED | OUTPATIENT
Start: 2023-07-27 | End: 2023-07-28 | Stop reason: HOSPADM

## 2023-07-27 RX ORDER — SODIUM CHLORIDE 0.9 % (FLUSH) 0.9 %
5-40 SYRINGE (ML) INJECTION EVERY 12 HOURS SCHEDULED
Status: DISCONTINUED | OUTPATIENT
Start: 2023-07-27 | End: 2023-07-28 | Stop reason: HOSPADM

## 2023-07-27 RX ORDER — SODIUM CHLORIDE 9 MG/ML
INJECTION, SOLUTION INTRAVENOUS PRN
Status: DISCONTINUED | OUTPATIENT
Start: 2023-07-27 | End: 2023-07-28 | Stop reason: HOSPADM

## 2023-07-27 RX ORDER — IBUPROFEN 200 MG
400 TABLET ORAL EVERY 6 HOURS PRN
Status: DISCONTINUED | OUTPATIENT
Start: 2023-07-27 | End: 2023-07-28 | Stop reason: HOSPADM

## 2023-07-27 RX ORDER — PAROXETINE HYDROCHLORIDE 20 MG/1
20 TABLET, FILM COATED ORAL DAILY
Status: DISCONTINUED | OUTPATIENT
Start: 2023-07-28 | End: 2023-07-28 | Stop reason: HOSPADM

## 2023-07-27 RX ORDER — ACETAMINOPHEN 650 MG/1
650 SUPPOSITORY RECTAL EVERY 6 HOURS PRN
Status: DISCONTINUED | OUTPATIENT
Start: 2023-07-27 | End: 2023-07-28 | Stop reason: HOSPADM

## 2023-07-27 RX ORDER — SODIUM CHLORIDE 0.9 % (FLUSH) 0.9 %
5-40 SYRINGE (ML) INJECTION PRN
Status: DISCONTINUED | OUTPATIENT
Start: 2023-07-27 | End: 2023-07-28 | Stop reason: HOSPADM

## 2023-07-27 RX ORDER — ONDANSETRON 4 MG/1
4 TABLET, ORALLY DISINTEGRATING ORAL EVERY 8 HOURS PRN
Status: DISCONTINUED | OUTPATIENT
Start: 2023-07-27 | End: 2023-07-28 | Stop reason: HOSPADM

## 2023-07-27 RX ORDER — ACETAMINOPHEN 325 MG/1
650 TABLET ORAL EVERY 6 HOURS PRN
Status: DISCONTINUED | OUTPATIENT
Start: 2023-07-27 | End: 2023-07-28 | Stop reason: HOSPADM

## 2023-07-27 RX ORDER — POLYETHYLENE GLYCOL 3350 17 G/17G
17 POWDER, FOR SOLUTION ORAL DAILY PRN
Status: DISCONTINUED | OUTPATIENT
Start: 2023-07-27 | End: 2023-07-28 | Stop reason: HOSPADM

## 2023-07-27 RX ADMIN — CARVEDILOL 25 MG: 12.5 TABLET, FILM COATED ORAL at 22:16

## 2023-07-27 RX ADMIN — SODIUM CHLORIDE, PRESERVATIVE FREE 10 ML: 5 INJECTION INTRAVENOUS at 23:33

## 2023-07-27 RX ADMIN — SACUBITRIL AND VALSARTAN 1 TABLET: 24; 26 TABLET, FILM COATED ORAL at 22:19

## 2023-07-27 RX ADMIN — TRAMADOL HYDROCHLORIDE 50 MG: 50 TABLET, COATED ORAL at 21:30

## 2023-07-27 RX ADMIN — APIXABAN 10 MG: 5 TABLET, FILM COATED ORAL at 21:30

## 2023-07-27 RX ADMIN — IOPAMIDOL 100 ML: 755 INJECTION, SOLUTION INTRAVENOUS at 18:09

## 2023-07-27 RX ADMIN — ROSUVASTATIN CALCIUM 40 MG: 10 TABLET, COATED ORAL at 21:30

## 2023-07-27 ASSESSMENT — ENCOUNTER SYMPTOMS
NAUSEA: 0
VOMITING: 0
SHORTNESS OF BREATH: 1
CONSTIPATION: 0
DIARRHEA: 0

## 2023-07-27 ASSESSMENT — PAIN SCALES - GENERAL: PAINLEVEL_OUTOF10: 7

## 2023-07-27 NOTE — ED PROVIDER NOTES
[facility] EMERGENCY DEPT  EMERGENCY DEPARTMENT ENCOUNTER      Pt Name: Primitivo Damon  MRN: [id number]  [address] [facility] 1955  Date of evaluation: 7/27/2023  Provider: Marciano Lopez PA-C    CHIEF COMPLAINT       Chief Complaint   Patient presents with    Shortness of Breath         HISTORY OF PRESENT ILLNESS   (Location/Symptom, Timing/Onset, Context/Setting, Quality, Duration, Modifying Factors, Severity)  Note limiting factors. HPI    Primitivo Damon is a 76 y.o. male with significant cardiac history who [facility] to [facility] with cc of right inspiratory chest pain. Patient reports since this morning, he is experienced 7 out of 10 sharp stabbing inspiratory pain on the right lateral chest.  No pain at baseline or with movement. Notes right calf pain over the last few days which has resolved. Notes recent travel to [place] with several hours in the car. Also notes recent exposure to somebody who was hospitalized with pneumonia. Denies fever, chills, nausea, vomiting, chest pain at baseline, cough, dyspnea at baseline, history of blood clots, trauma, injury, any other concerns at this time. PMH: PAD, renal artery stenosis, CHF, CAD, V. tach, biventricular ICD, ischemic cardiomyopathy, CABG    PCP: Pcp No    There are no other complaints, changes or physical findings at this time. Review of External Medical Records:     Nursing Notes were reviewed. REVIEW OF SYSTEMS    (2-9 systems for level 4, 10 or more for level 5)     Review of Systems   Constitutional:  Negative for chills and fever. HENT:  Negative for congestion. Respiratory:  Positive for shortness of breath. Cardiovascular:  Positive for chest pain. Gastrointestinal:  Negative for constipation, diarrhea, nausea and vomiting. Genitourinary:  Negative for dysuria and hematuria. Musculoskeletal:  Positive for myalgias. Skin:  Negative for rash. Neurological:  Negative for dizziness, light-headedness and headaches.

## 2023-07-28 ENCOUNTER — APPOINTMENT (OUTPATIENT)
Facility: HOSPITAL | Age: 68
DRG: 176 | End: 2023-07-28
Attending: STUDENT IN AN ORGANIZED HEALTH CARE EDUCATION/TRAINING PROGRAM
Payer: MEDICARE

## 2023-07-28 VITALS
HEIGHT: 74 IN | RESPIRATION RATE: 17 BRPM | OXYGEN SATURATION: 94 % | DIASTOLIC BLOOD PRESSURE: 70 MMHG | TEMPERATURE: 97.5 F | BODY MASS INDEX: 28.88 KG/M2 | WEIGHT: 225 LBS | SYSTOLIC BLOOD PRESSURE: 104 MMHG | HEART RATE: 60 BPM

## 2023-07-28 LAB
ALBUMIN SERPL-MCNC: 3.1 G/DL (ref 3.5–5)
ALBUMIN/GLOB SERPL: 0.7 (ref 1.1–2.2)
ALP SERPL-CCNC: 72 U/L (ref 45–117)
ALT SERPL-CCNC: 16 U/L (ref 12–78)
ANION GAP SERPL CALC-SCNC: 5 MMOL/L (ref 5–15)
AST SERPL-CCNC: 12 U/L (ref 15–37)
BASOPHILS # BLD: 0.1 K/UL (ref 0–0.1)
BASOPHILS NFR BLD: 1 % (ref 0–1)
BILIRUB SERPL-MCNC: 0.4 MG/DL (ref 0.2–1)
BUN SERPL-MCNC: 18 MG/DL (ref 6–20)
BUN/CREAT SERPL: 13 (ref 12–20)
CALCIUM SERPL-MCNC: 9.4 MG/DL (ref 8.5–10.1)
CHLORIDE SERPL-SCNC: 111 MMOL/L (ref 97–108)
CO2 SERPL-SCNC: 25 MMOL/L (ref 21–32)
CREAT SERPL-MCNC: 1.4 MG/DL (ref 0.7–1.3)
DIFFERENTIAL METHOD BLD: ABNORMAL
ECHO AO ASC DIAM: 3 CM
ECHO AO ASCENDING AORTA INDEX: 1.32 CM/M2
ECHO AV AREA PEAK VELOCITY: 3.7 CM2
ECHO AV AREA VTI: 3.6 CM2
ECHO AV AREA/BSA PEAK VELOCITY: 1.6 CM2/M2
ECHO AV AREA/BSA VTI: 1.6 CM2/M2
ECHO AV MEAN GRADIENT: 3 MMHG
ECHO AV MEAN VELOCITY: 0.8 M/S
ECHO AV PEAK GRADIENT: 5 MMHG
ECHO AV PEAK VELOCITY: 1.2 M/S
ECHO AV VELOCITY RATIO: 0.83
ECHO AV VTI: 25.7 CM
ECHO BSA: 2.31 M2
ECHO LA DIAMETER INDEX: 1.8 CM/M2
ECHO LA DIAMETER: 4.1 CM
ECHO LA VOL 2C: 79 ML (ref 18–58)
ECHO LA VOL 2C: 80 ML (ref 18–58)
ECHO LA VOL 4C: 66 ML (ref 18–58)
ECHO LA VOL 4C: 68 ML (ref 18–58)
ECHO LA VOL BP: 75 ML (ref 18–58)
ECHO LA VOL/BSA BIPLANE: 33 ML/M2 (ref 16–34)
ECHO LA VOLUME AREA LENGTH: 76 ML
ECHO LA VOLUME INDEX AREA LENGTH: 33 ML/M2 (ref 16–34)
ECHO LV E' LATERAL VELOCITY: 6 CM/S
ECHO LV E' SEPTAL VELOCITY: 6 CM/S
ECHO LV EDV A2C: 292 ML
ECHO LV EDV A4C: 205 ML
ECHO LV EDV BP: 251 ML (ref 67–155)
ECHO LV EDV INDEX A4C: 90 ML/M2
ECHO LV EDV INDEX BP: 110 ML/M2
ECHO LV EDV NDEX A2C: 128 ML/M2
ECHO LV EJECTION FRACTION A2C: 40 %
ECHO LV EJECTION FRACTION A4C: 53 %
ECHO LV EJECTION FRACTION BIPLANE: 48 % (ref 55–100)
ECHO LV ESV A2C: 176 ML
ECHO LV ESV A4C: 96 ML
ECHO LV ESV BP: 131 ML (ref 22–58)
ECHO LV ESV INDEX A2C: 77 ML/M2
ECHO LV ESV INDEX A4C: 42 ML/M2
ECHO LV ESV INDEX BP: 57 ML/M2
ECHO LV FRACTIONAL SHORTENING: 7 % (ref 28–44)
ECHO LV INTERNAL DIMENSION DIASTOLE INDEX: 2.63 CM/M2
ECHO LV INTERNAL DIMENSION DIASTOLIC: 6 CM (ref 4.2–5.9)
ECHO LV INTERNAL DIMENSION SYSTOLIC INDEX: 2.46 CM/M2
ECHO LV INTERNAL DIMENSION SYSTOLIC: 5.6 CM
ECHO LV IVSD: 1.2 CM (ref 0.6–1)
ECHO LV MASS 2D: 331.8 G (ref 88–224)
ECHO LV MASS INDEX 2D: 145.5 G/M2 (ref 49–115)
ECHO LV POSTERIOR WALL DIASTOLIC: 1.3 CM (ref 0.6–1)
ECHO LV RELATIVE WALL THICKNESS RATIO: 0.43
ECHO LVOT AREA: 4.5 CM2
ECHO LVOT AV VTI INDEX: 0.8
ECHO LVOT DIAM: 2.4 CM
ECHO LVOT MEAN GRADIENT: 2 MMHG
ECHO LVOT PEAK GRADIENT: 4 MMHG
ECHO LVOT PEAK VELOCITY: 1 M/S
ECHO LVOT STROKE VOLUME INDEX: 40.9 ML/M2
ECHO LVOT SV: 93.1 ML
ECHO LVOT VTI: 20.6 CM
ECHO MV A VELOCITY: 0.84 M/S
ECHO MV E DECELERATION TIME (DT): 360.9 MS
ECHO MV E VELOCITY: 0.65 M/S
ECHO MV E/A RATIO: 0.77
ECHO MV E/E' LATERAL: 10.83
ECHO MV E/E' RATIO (AVERAGED): 10.83
ECHO MV E/E' SEPTAL: 10.83
ECHO RV FREE WALL PEAK S': 11 CM/S
ECHO RV INTERNAL DIMENSION: 3.7 CM
ECHO RV TAPSE: 1.6 CM (ref 1.7–?)
EOSINOPHIL # BLD: 0.5 K/UL (ref 0–0.4)
EOSINOPHIL NFR BLD: 5 % (ref 0–7)
ERYTHROCYTE [DISTWIDTH] IN BLOOD BY AUTOMATED COUNT: 14.4 % (ref 11.5–14.5)
GLOBULIN SER CALC-MCNC: 4.3 G/DL (ref 2–4)
GLUCOSE SERPL-MCNC: 125 MG/DL (ref 65–100)
HCT VFR BLD AUTO: 45.5 % (ref 36.6–50.3)
HGB BLD-MCNC: 15 G/DL (ref 12.1–17)
IMM GRANULOCYTES # BLD AUTO: 0 K/UL (ref 0–0.04)
IMM GRANULOCYTES NFR BLD AUTO: 0 % (ref 0–0.5)
LYMPHOCYTES # BLD: 2.2 K/UL (ref 0.8–3.5)
LYMPHOCYTES NFR BLD: 21 % (ref 12–49)
MAGNESIUM SERPL-MCNC: 2.4 MG/DL (ref 1.6–2.4)
MCH RBC QN AUTO: 31.1 PG (ref 26–34)
MCHC RBC AUTO-ENTMCNC: 33 G/DL (ref 30–36.5)
MCV RBC AUTO: 94.2 FL (ref 80–99)
MONOCYTES # BLD: 0.6 K/UL (ref 0–1)
MONOCYTES NFR BLD: 6 % (ref 5–13)
NEUTS SEG # BLD: 6.7 K/UL (ref 1.8–8)
NEUTS SEG NFR BLD: 67 % (ref 32–75)
NRBC # BLD: 0 K/UL (ref 0–0.01)
NRBC BLD-RTO: 0 PER 100 WBC
PHOSPHATE SERPL-MCNC: 2.7 MG/DL (ref 2.6–4.7)
PLATELET # BLD AUTO: 166 K/UL (ref 150–400)
PMV BLD AUTO: 11.5 FL (ref 8.9–12.9)
POTASSIUM SERPL-SCNC: 3.6 MMOL/L (ref 3.5–5.1)
PROT SERPL-MCNC: 7.4 G/DL (ref 6.4–8.2)
RBC # BLD AUTO: 4.83 M/UL (ref 4.1–5.7)
SARS-COV-2 RDRP RESP QL NAA+PROBE: NOT DETECTED
SODIUM SERPL-SCNC: 141 MMOL/L (ref 136–145)
SOURCE: NORMAL
WBC # BLD AUTO: 10.1 K/UL (ref 4.1–11.1)

## 2023-07-28 PROCEDURE — G0378 HOSPITAL OBSERVATION PER HR: HCPCS

## 2023-07-28 PROCEDURE — 80053 COMPREHEN METABOLIC PANEL: CPT

## 2023-07-28 PROCEDURE — 93306 TTE W/DOPPLER COMPLETE: CPT

## 2023-07-28 PROCEDURE — 6370000000 HC RX 637 (ALT 250 FOR IP): Performed by: STUDENT IN AN ORGANIZED HEALTH CARE EDUCATION/TRAINING PROGRAM

## 2023-07-28 PROCEDURE — 83735 ASSAY OF MAGNESIUM: CPT

## 2023-07-28 PROCEDURE — 93306 TTE W/DOPPLER COMPLETE: CPT | Performed by: SPECIALIST

## 2023-07-28 PROCEDURE — 36415 COLL VENOUS BLD VENIPUNCTURE: CPT

## 2023-07-28 PROCEDURE — 84100 ASSAY OF PHOSPHORUS: CPT

## 2023-07-28 PROCEDURE — 2580000003 HC RX 258: Performed by: STUDENT IN AN ORGANIZED HEALTH CARE EDUCATION/TRAINING PROGRAM

## 2023-07-28 PROCEDURE — 85025 COMPLETE CBC W/AUTO DIFF WBC: CPT

## 2023-07-28 RX ADMIN — ASPIRIN 81 MG: 81 TABLET, COATED ORAL at 08:51

## 2023-07-28 RX ADMIN — APIXABAN 10 MG: 5 TABLET, FILM COATED ORAL at 08:48

## 2023-07-28 RX ADMIN — SODIUM CHLORIDE, PRESERVATIVE FREE 10 ML: 5 INJECTION INTRAVENOUS at 08:52

## 2023-07-28 RX ADMIN — PAROXETINE HYDROCHLORIDE 20 MG: 20 TABLET, FILM COATED ORAL at 08:50

## 2023-07-28 RX ADMIN — SACUBITRIL AND VALSARTAN 1 TABLET: 24; 26 TABLET, FILM COATED ORAL at 08:50

## 2023-07-28 RX ADMIN — CARVEDILOL 25 MG: 12.5 TABLET, FILM COATED ORAL at 08:51

## 2023-07-28 NOTE — PROGRESS NOTES
Pt is discharged to home with self as transport. Discharge instructions were provided along with the opportunity for questions and concerns. The pt verbalized understanding and I accompanied him to the front of the hospital for home.

## 2023-07-28 NOTE — PLAN OF CARE
Problem: Safety - Adult  Goal: Free from fall injury  7/28/2023 0429 by Mayo Walter RN  Outcome: Progressing  7/28/2023 0429 by Mayo Walter RN  Outcome: Progressing  7/28/2023 0429 by Mayo Walter RN  Outcome: Progressing     Problem: Pain  Goal: Verbalizes/displays adequate comfort level or baseline comfort level  7/28/2023 0429 by Mayo Walter RN  Outcome: Progressing  7/28/2023 0429 by Mayo Walter RN  Outcome: Progressing

## 2023-07-28 NOTE — DISCHARGE INSTRUCTIONS
HOSPITALIST DISCHARGE INSTRUCTIONS  NAME: Rosa Isela Ochoa   :  1955   MRN:  [id number]     Date/Time:  2023 12:00 PM    ADMIT DATE: 2023     DISCHARGE DATE: 2023     DISCHARGE DIAGNOSIS:    Discharged home after an admission for provoked DVT and pulmonary embolism. We discussed the need for Eliquis which is a blood thinner for the next 3 to 6 months. We discussed side effects of Eliquis including but not limited to life-threatening bleeds and even death. We agreed that at this time benefits outweigh the risk. We discussed not stopping Eliquis until instructed by a physician. We discussed that you will follow-up with your cardiologist for further refills for at least 3 to 6 months worth of Eliquis. MEDICATIONS:    It is important that you take the medication exactly as they are prescribed. Keep your medication in the bottles provided by the pharmacist and keep a list of the medication names, dosages, and times to be taken in your wallet. Do not take other medications without consulting your doctor. Current Discharge Medication List        START taking these medications    Details   apixaban (ELIQUIS) 5 MG TABS tablet Take 2 tablets of 5 mg daily x6 days, followed by one 5 mg tablet twice a day for new onset pulmonary embolism. Will need refills for at least 3 to 6 months duration.   Qty: 70 tablet, Refills: 0  Start date: 2023           CONTINUE these medications which have NOT CHANGED    Details   rosuvastatin (CRESTOR) 40 MG tablet Take 1 tablet by mouth nightly  Qty: 90 tablet, Refills: 3      aspirin 81 MG EC tablet Take 1 tablet by mouth daily      carvedilol (COREG) 25 MG tablet Take 1 tablet by mouth 2 times daily      PARoxetine (PAXIL) 20 MG tablet Take 1 tablet by mouth daily      sacubitril-valsartan (ENTRESTO) 24-26 MG per tablet Take 1 tablet by mouth 2 times daily           STOP taking these medications       nitroGLYCERIN (NITROSTAT) 0.4 MG SL tablet Comments:

## 2023-07-28 NOTE — DISCHARGE SUMMARY
Physician Discharge Summary     Patient ID:  Juma Jhaveri  [id number]  87 y.o.  1955    Admit date: 7/27/2023    Discharge date and time: 7/28/2023    HPI: Juma Jhaveri is a 76 y.o. male with hx CAD, Systolic congestive heart failure s/p ICD, Carotid artery disease, tobacco dependence, anxiety of who presents with chest pain . Patient reports acute onset of chest pain in bilateral anterior chest more prominent in right upper chest, described as stabbing, 7-10/10 in intensity, nonradiating, worse when taking a deep breath that began this morning. He was on a trip to [place] this past weekend where he drove 8 hours each way with no breaks. On Monday morning, he noted his right calf was hurting as though he had a '[address] Horse', pain spontaneously resolved. He denies any hx of DVT/PE in his family. He is having difficulty taking deep breaths but denies any shortness of breath. At baseline, walks 5 to 6 miles daily  Smokes 1/3 pack cigarettes daily      The patient denies any headache, blurry vision, sore throat, trouble swallowing, trouble with speech, chest pain, SOB, cough, fever, chills, N/V/D, abd pain, urinary symptoms, constipation, recent travels, sick contacts, focal or generalized neurological symptoms, falls, injuries, rashes, contact with COVID-19 diagnosed patients, hematemesis, melena, hemoptysis, hematuria, rashes, denies starting any new medications and denies any other concerns or problems besides as mentioned above. Admission Diagnoses: Acute PE. Discharge Diagnoses  and Hospital Course:     Acute bilateral pulmonary emboli  Acute chest pain due to above  DVT, right peroneal vein   Admitted with a suspected provoked bilateral PE given history of lung car travel. CT of the chest with no obvious pulmonary strain. US duplex with acute nonocclusive DVT of the peroneal vein on the right. Started on oral Eliquis which he will be discharged home on as well.   Not requiring supplemental oxygen. Echocardiogram reviewed with moderate global hypokinesis and EF of 30 to 35%; he does have a history of cardiomyopathy and follows up with a cardiologist.    Chronic conditions:  Chronic systolic heart failure not in exacerbation  CAD  AICD in place, recent 2017  Hypertension  CKD III, baseline  Hyperlipidemia  History of ventricular tachycardia  Peripheral artery disease  Recent pharmacologic nuclear stress test in March 2023 was negative for reversible ischemia. Home medications resumed upon discharge including his aspirin. Patient does not have a PCP but we discussed that he will follow-up with his cardiologist for refills of his Eliquis. PCP: Pcp No     Consults: none    Condition of patient at discharge: Good    Discharge Exam:    Physical Exam:    Gen:  No acute distress. HEENT:  NC/AT. Neck:  Supple. Resp:  Unlabored breathing. Clear breath sounds with good air entry. Card:  Regular rate and rhythm. Normal S1 and S2. Abd:  Soft, non-tender, non-distended, normoactive bowel sounds. Ext: No clubbing, cyanosis or edema. Skin:  No rashes or ulcers, skin turgor is good. Neuro:  Moving all extremities with no gross focal deficits appreciated. Psych:  Good insight, oriented to person, place and time, alert. Disposition: home    Patient Instructions:   Current Discharge Medication List        START taking these medications    Details   apixaban (ELIQUIS) 5 MG TABS tablet Take 2 tablets of 5 mg BID x6 days, followed by one 5 mg tablet twice a day for new onset pulmonary embolism. Will need refills for at least 3 to 6 months duration.   Qty: 70 tablet, Refills: 0           CONTINUE these medications which have NOT CHANGED    Details   rosuvastatin (CRESTOR) 40 MG tablet Take 1 tablet by mouth nightly  Qty: 90 tablet, Refills: 3      aspirin 81 MG EC tablet Take 1 tablet by mouth daily      carvedilol (COREG) 25 MG tablet Take 1 tablet by mouth 2 times daily      PARoxetine (PAXIL) 20 MG

## 2023-07-28 NOTE — ED NOTES
TRANSFER - OUT REPORT:    Verbal report given to Gen BENNETT on Sebastian Sánchez  being transferred to 5th floor for routine progression of patient care       Report consisted of patient's Situation, Background, Assessment and   Recommendations(SBAR). Information from the following report(s) ED Encounter Summary and ED SBAR was reviewed with the receiving nurse. [place] Fall Assessment:    Presents to emergency department  because of falls (Syncope, seizure, or loss of consciousness): No  Age > 70: No  Altered Mental Status, Intoxication with alcohol or substance confusion (Disorientation, impaired judgment, poor safety awaremess, or inability to follow instructions): No  Impaired Mobility: Ambulates or transfers with assistive devices or assistance; Unable to ambulate or transer.: No  Nursing Judgement: No          Lines:   Peripheral IV 07/27/23 Left;Posterior Hand (Active)        Opportunity for questions and clarification was provided.       Patient transported with:  Wesley Peralta RN  07/27/23 3776

## 2023-07-30 LAB
EKG ATRIAL RATE: 64 BPM
EKG DIAGNOSIS: NORMAL
EKG P-R INTERVAL: 264 MS
EKG Q-T INTERVAL: 454 MS
EKG QRS DURATION: 156 MS
EKG QTC CALCULATION (BAZETT): 468 MS
EKG R AXIS: 258 DEGREES
EKG T AXIS: 48 DEGREES
EKG VENTRICULAR RATE: 64 BPM

## 2023-07-30 PROCEDURE — 93010 ELECTROCARDIOGRAM REPORT: CPT | Performed by: SPECIALIST

## 2023-07-31 ENCOUNTER — TELEPHONE (OUTPATIENT)
Age: 68
End: 2023-07-31

## 2023-07-31 SDOH — HEALTH STABILITY: PHYSICAL HEALTH: ON AVERAGE, HOW MANY DAYS PER WEEK DO YOU ENGAGE IN MODERATE TO STRENUOUS EXERCISE (LIKE A BRISK WALK)?: 5 DAYS

## 2023-07-31 SDOH — HEALTH STABILITY: PHYSICAL HEALTH: ON AVERAGE, HOW MANY MINUTES DO YOU ENGAGE IN EXERCISE AT THIS LEVEL?: 100 MIN

## 2023-07-31 ASSESSMENT — SOCIAL DETERMINANTS OF HEALTH (SDOH)
WITHIN THE LAST YEAR, HAVE YOU BEEN KICKED, HIT, SLAPPED, OR OTHERWISE PHYSICALLY HURT BY YOUR PARTNER OR EX-PARTNER?: NO
WITHIN THE LAST YEAR, HAVE YOU BEEN AFRAID OF YOUR PARTNER OR EX-PARTNER?: NO
WITHIN THE LAST YEAR, HAVE TO BEEN RAPED OR FORCED TO HAVE ANY KIND OF SEXUAL ACTIVITY BY YOUR PARTNER OR EX-PARTNER?: NO
WITHIN THE LAST YEAR, HAVE YOU BEEN HUMILIATED OR EMOTIONALLY ABUSED IN OTHER WAYS BY YOUR PARTNER OR EX-PARTNER?: NO

## 2023-07-31 NOTE — TELEPHONE ENCOUNTER
Spoke with patient after ID x2   Per patient he went to ER for pain on inspiration right chest; blood clot in leg went to lung; sent home on eliquis and calling to schedule hospital follow up. Scheduled follow up with NP for 8/8/23.

## 2023-07-31 NOTE — TELEPHONE ENCOUNTER
Pt called and stated he was currently in hospital,and would like to speak to nurse about hospital visit,please advise      [phone]

## 2023-08-03 ENCOUNTER — TELEMEDICINE (OUTPATIENT)
Facility: CLINIC | Age: 68
End: 2023-08-03
Payer: MEDICARE

## 2023-08-03 DIAGNOSIS — I26.99 PULMONARY EMBOLISM, BILATERAL (HCC): Primary | ICD-10-CM

## 2023-08-03 DIAGNOSIS — I82.4Z9 ACUTE DEEP VEIN THROMBOSIS (DVT) OF DISTAL VEIN OF LOWER EXTREMITY, UNSPECIFIED LATERALITY (HCC): ICD-10-CM

## 2023-08-03 DIAGNOSIS — Z72.0 TOBACCO ABUSE: ICD-10-CM

## 2023-08-03 PROBLEM — F41.0 PANIC ATTACKS: Status: ACTIVE | Noted: 2023-08-03

## 2023-08-03 PROCEDURE — G8419 CALC BMI OUT NRM PARAM NOF/U: HCPCS | Performed by: FAMILY MEDICINE

## 2023-08-03 PROCEDURE — 4004F PT TOBACCO SCREEN RCVD TLK: CPT | Performed by: FAMILY MEDICINE

## 2023-08-03 PROCEDURE — 1111F DSCHRG MED/CURRENT MED MERGE: CPT | Performed by: FAMILY MEDICINE

## 2023-08-03 PROCEDURE — 99202 OFFICE O/P NEW SF 15 MIN: CPT | Performed by: FAMILY MEDICINE

## 2023-08-03 PROCEDURE — 1123F ACP DISCUSS/DSCN MKR DOCD: CPT | Performed by: FAMILY MEDICINE

## 2023-08-03 PROCEDURE — 3017F COLORECTAL CA SCREEN DOC REV: CPT | Performed by: FAMILY MEDICINE

## 2023-08-03 PROCEDURE — G8427 DOCREV CUR MEDS BY ELIG CLIN: HCPCS | Performed by: FAMILY MEDICINE

## 2023-08-03 RX ORDER — LISINOPRIL 20 MG/1
20 TABLET ORAL DAILY
COMMUNITY
Start: 2022-04-19

## 2023-08-03 ASSESSMENT — ENCOUNTER SYMPTOMS: SHORTNESS OF BREATH: 0

## 2023-08-03 NOTE — PROGRESS NOTES
Chief Complaint   Patient presents with    [address] from Hospital     1. Have you been to the ER, urgent care clinic since your last visit? Hospitalized since your last visit? Yes When: 07/27/23 Where: [facility]  Reason for visit: Pulmonary Embolism. 2. Have you seen or consulted any other health care providers outside of the [address] [place] [address] Avenue since your last visit? Include any pap smears or colon screening.  No

## 2023-08-03 NOTE — PROGRESS NOTES
Lexie Hurley (:  1955) is a New patient, presenting virtually for evaluation of the following:    Assessment & Plan   Below is the assessment and plan developed based on review of pertinent history, physical exam, labs, studies, and medications. 1. Pulmonary embolism, bilateral ([address])  2. Acute deep vein thrombosis (DVT) of distal vein of lower extremity, unspecified laterality (HCC)  3. Tobacco abuse    Doing well  Continue Eliquis  Follow up with Cardiology as planned  Counseled to stop smoking    Return in about 6 weeks (around 2023) for Medicare Annual Wellness Visit, panic attacks. Subjective   Patient presents with:  [address] from [address] [place] [address] is here for hospital follow up after being admitted 23 to 23 for a DVT/PE. Discharge summary reviewed. Currently, he is taking Eliquis. He is having mild right rib pain from time to time. Tylenol helps at night. His Cardiology follow up is next week. Hospital Problems:    Acute bilateral pulmonary emboli  Acute chest pain due to above  DVT, right peroneal vein   Admitted with a suspected provoked bilateral PE given history of lung car travel. CT of the chest with no obvious pulmonary strain. US duplex with acute nonocclusive DVT of the peroneal vein on the right. Started on oral Eliquis which he will be discharged home on as well. Not requiring supplemental oxygen. Echocardiogram reviewed with moderate global hypokinesis and EF of 30 to 35%; he does have a history of cardiomyopathy and follows up with a cardiologist.     Chronic conditions:    Chronic systolic heart failure not in exacerbation  CAD  AICD in place, recent 2017  Hypertension  CKD III, baseline  Hyperlipidemia  History of ventricular tachycardia  Peripheral artery disease  Recent pharmacologic nuclear stress test in 2023 was negative for reversible ischemia. Home medications resumed upon discharge including his aspirin.

## 2023-08-08 ENCOUNTER — OFFICE VISIT (OUTPATIENT)
Age: 68
End: 2023-08-08
Payer: MEDICARE

## 2023-08-08 VITALS
WEIGHT: 226 LBS | HEIGHT: 74 IN | DIASTOLIC BLOOD PRESSURE: 72 MMHG | HEART RATE: 50 BPM | BODY MASS INDEX: 29 KG/M2 | OXYGEN SATURATION: 95 % | SYSTOLIC BLOOD PRESSURE: 136 MMHG

## 2023-08-08 DIAGNOSIS — Z95.810 BIVENTRICULAR ICD (IMPLANTABLE CARDIOVERTER-DEFIBRILLATOR) IN PLACE: ICD-10-CM

## 2023-08-08 DIAGNOSIS — I47.20 V-TACH (HCC): ICD-10-CM

## 2023-08-08 DIAGNOSIS — I70.1 RENAL ARTERY STENOSIS (HCC): ICD-10-CM

## 2023-08-08 DIAGNOSIS — I73.9 PAD (PERIPHERAL ARTERY DISEASE) (HCC): ICD-10-CM

## 2023-08-08 DIAGNOSIS — I25.5 ISCHEMIC CARDIOMYOPATHY: Primary | ICD-10-CM

## 2023-08-08 DIAGNOSIS — I25.10 CORONARY ARTERY DISEASE INVOLVING NATIVE HEART WITHOUT ANGINA PECTORIS, UNSPECIFIED VESSEL OR LESION TYPE: ICD-10-CM

## 2023-08-08 DIAGNOSIS — I50.22 CHRONIC SYSTOLIC CONGESTIVE HEART FAILURE (HCC): ICD-10-CM

## 2023-08-08 PROCEDURE — G8419 CALC BMI OUT NRM PARAM NOF/U: HCPCS

## 2023-08-08 PROCEDURE — 1123F ACP DISCUSS/DSCN MKR DOCD: CPT

## 2023-08-08 PROCEDURE — 3017F COLORECTAL CA SCREEN DOC REV: CPT

## 2023-08-08 PROCEDURE — 99214 OFFICE O/P EST MOD 30 MIN: CPT

## 2023-08-08 PROCEDURE — 1111F DSCHRG MED/CURRENT MED MERGE: CPT

## 2023-08-08 PROCEDURE — 4004F PT TOBACCO SCREEN RCVD TLK: CPT

## 2023-08-08 PROCEDURE — G8427 DOCREV CUR MEDS BY ELIG CLIN: HCPCS

## 2023-08-08 NOTE — PROGRESS NOTES
Fer Vallejo is a 76 y.o. male    had concerns including Cardiomyopathy, Coronary Artery Disease, and Congestive Heart Failure. Vitals:    08/08/23 1358   BP: 136/72   Site: Right Upper Arm   Position: Sitting   Pulse: 50   SpO2: 95%   Weight: 226 lb (102.5 kg)   Height: 6' 2\" (1.88 m)      PT STATES HE IS TAKING TYLENOL EVERYDAY ONCE A DAY FOR ACHES AND DISCOMFORT IN HIS BACK AND UPPER RIGHT SIDE     Chest pain NO    SOB NO    Dizziness NO    Swelling NO    Palpitations NO    Refills NO    Covid Vaccinated YES       1. Have you been to the ER, urgent care clinic since your last visit? Hospitalized since your last visit? YES 7/27/23    2. Have you seen or consulted any other health care providers outside of the [address] [place] [address] since your last visit? Include any pap smears or colon screening.  NO

## 2023-08-23 ENCOUNTER — APPOINTMENT (OUTPATIENT)
Facility: HOSPITAL | Age: 68
End: 2023-08-23
Payer: MEDICARE

## 2023-08-23 ENCOUNTER — HOSPITAL ENCOUNTER (INPATIENT)
Facility: HOSPITAL | Age: 68
LOS: 1 days | Discharge: HOME | End: 2023-08-24
Attending: STUDENT IN AN ORGANIZED HEALTH CARE EDUCATION/TRAINING PROGRAM | Admitting: INTERNAL MEDICINE
Payer: MEDICARE

## 2023-08-23 DIAGNOSIS — Z79.01 CURRENT USE OF LONG TERM ANTICOAGULATION: ICD-10-CM

## 2023-08-23 DIAGNOSIS — R04.2 HEMOPTYSIS: Primary | ICD-10-CM

## 2023-08-23 DIAGNOSIS — Z86.711 HISTORY OF PULMONARY EMBOLUS (PE): ICD-10-CM

## 2023-08-23 PROBLEM — I47.20 V-TACH (HCC): Status: RESOLVED | Noted: 2023-02-21 | Resolved: 2023-08-23

## 2023-08-23 PROBLEM — Z72.0 TOBACCO ABUSE: Status: RESOLVED | Noted: 2023-08-03 | Resolved: 2023-08-23

## 2023-08-23 PROBLEM — I70.1 RENAL ARTERY STENOSIS (HCC): Status: ACTIVE | Noted: 2023-02-21

## 2023-08-23 PROBLEM — I25.10 CORONARY ARTERY DISEASE INVOLVING NATIVE HEART: Status: ACTIVE | Noted: 2023-02-21

## 2023-08-23 PROBLEM — I73.9 PAD (PERIPHERAL ARTERY DISEASE) (HCC): Status: ACTIVE | Noted: 2023-02-21

## 2023-08-23 PROBLEM — N18.30 CKD (CHRONIC KIDNEY DISEASE) STAGE 3, GFR 30-59 ML/MIN (HCC): Status: ACTIVE | Noted: 2023-08-23

## 2023-08-23 PROBLEM — I50.9 CONGESTIVE HEART FAILURE (HCC): Status: ACTIVE | Noted: 2023-02-21

## 2023-08-23 PROBLEM — Z95.810 BIVENTRICULAR ICD (IMPLANTABLE CARDIOVERTER-DEFIBRILLATOR) IN PLACE: Status: ACTIVE | Noted: 2023-02-21

## 2023-08-23 PROBLEM — I50.22 CHF (CONGESTIVE HEART FAILURE), NYHA CLASS I, CHRONIC, SYSTOLIC (HCC): Status: ACTIVE | Noted: 2023-08-23

## 2023-08-23 PROBLEM — I25.5 ISCHEMIC CARDIOMYOPATHY: Status: ACTIVE | Noted: 2023-02-21

## 2023-08-23 PROBLEM — Z86.718 HX OF DEEP VENOUS THROMBOSIS: Status: ACTIVE | Noted: 2023-08-23

## 2023-08-23 LAB
ALBUMIN SERPL-MCNC: 3.2 G/DL (ref 3.5–5)
ALBUMIN/GLOB SERPL: 0.8 (ref 1.1–2.2)
ALP SERPL-CCNC: 78 U/L (ref 45–117)
ALT SERPL-CCNC: 20 U/L (ref 12–78)
AMPHET UR QL SCN: NEGATIVE
ANION GAP SERPL CALC-SCNC: 5 MMOL/L (ref 5–15)
APTT PPP: 28.8 SEC (ref 22.1–31)
AST SERPL-CCNC: 16 U/L (ref 15–37)
B PERT DNA SPEC QL NAA+PROBE: NOT DETECTED
BARBITURATES UR QL SCN: NEGATIVE
BASOPHILS # BLD: 0.2 K/UL (ref 0–0.1)
BASOPHILS NFR BLD: 2 % (ref 0–1)
BENZODIAZ UR QL: NEGATIVE
BILIRUB SERPL-MCNC: 0.3 MG/DL (ref 0.2–1)
BORDETELLA PARAPERTUSSIS BY PCR: NOT DETECTED
BUN SERPL-MCNC: 18 MG/DL (ref 6–20)
BUN/CREAT SERPL: 13 (ref 12–20)
C PNEUM DNA SPEC QL NAA+PROBE: NOT DETECTED
CALCIUM SERPL-MCNC: 8.8 MG/DL (ref 8.5–10.1)
CANNABINOIDS UR QL SCN: NEGATIVE
CHLORIDE SERPL-SCNC: 116 MMOL/L (ref 97–108)
CO2 SERPL-SCNC: 23 MMOL/L (ref 21–32)
COCAINE UR QL SCN: NEGATIVE
COMMENT:: NORMAL
COMMENT:: NORMAL
CREAT SERPL-MCNC: 1.37 MG/DL (ref 0.7–1.3)
D DIMER PPP FEU-MCNC: 6.34 MG/L FEU (ref 0–0.65)
DIFFERENTIAL METHOD BLD: ABNORMAL
EKG ATRIAL RATE: 60 BPM
EKG DIAGNOSIS: NORMAL
EKG P AXIS: 115 DEGREES
EKG P-R INTERVAL: 222 MS
EKG Q-T INTERVAL: 474 MS
EKG QRS DURATION: 162 MS
EKG QTC CALCULATION (BAZETT): 474 MS
EKG R AXIS: -79 DEGREES
EKG T AXIS: 120 DEGREES
EKG VENTRICULAR RATE: 60 BPM
EOSINOPHIL # BLD: 1.6 K/UL (ref 0–0.4)
EOSINOPHIL NFR BLD: 16 % (ref 0–7)
ERYTHROCYTE [DISTWIDTH] IN BLOOD BY AUTOMATED COUNT: 14.3 % (ref 11.5–14.5)
ETHANOL SERPL-MCNC: <10 MG/DL (ref 0–0.08)
FERRITIN SERPL-MCNC: 229 NG/ML (ref 26–388)
FLUAV SUBTYP SPEC NAA+PROBE: NOT DETECTED
FLUBV RNA SPEC QL NAA+PROBE: NOT DETECTED
GLOBULIN SER CALC-MCNC: 4.2 G/DL (ref 2–4)
GLUCOSE SERPL-MCNC: 125 MG/DL (ref 65–100)
HADV DNA SPEC QL NAA+PROBE: NOT DETECTED
HCOV 229E RNA SPEC QL NAA+PROBE: NOT DETECTED
HCOV HKU1 RNA SPEC QL NAA+PROBE: NOT DETECTED
HCOV NL63 RNA SPEC QL NAA+PROBE: NOT DETECTED
HCOV OC43 RNA SPEC QL NAA+PROBE: NOT DETECTED
HCT VFR BLD AUTO: 44.4 % (ref 36.6–50.3)
HCT VFR BLD AUTO: 46.3 % (ref 36.6–50.3)
HGB BLD-MCNC: 14.5 G/DL (ref 12.1–17)
HGB BLD-MCNC: 15 G/DL (ref 12.1–17)
HMPV RNA SPEC QL NAA+PROBE: NOT DETECTED
HPIV1 RNA SPEC QL NAA+PROBE: NOT DETECTED
HPIV2 RNA SPEC QL NAA+PROBE: NOT DETECTED
HPIV3 RNA SPEC QL NAA+PROBE: NOT DETECTED
HPIV4 RNA SPEC QL NAA+PROBE: NOT DETECTED
IMM GRANULOCYTES # BLD AUTO: 0 K/UL (ref 0–0.04)
IMM GRANULOCYTES NFR BLD AUTO: 0 % (ref 0–0.5)
INR PPP: 1.1 (ref 0.9–1.1)
IRON SATN MFR SERPL: 24 % (ref 20–50)
IRON SERPL-MCNC: 61 UG/DL (ref 35–150)
LYMPHOCYTES # BLD: 2.8 K/UL (ref 0.8–3.5)
LYMPHOCYTES NFR BLD: 27 % (ref 12–49)
Lab: NORMAL
M PNEUMO DNA SPEC QL NAA+PROBE: NOT DETECTED
MCH RBC QN AUTO: 30.9 PG (ref 26–34)
MCHC RBC AUTO-ENTMCNC: 32.4 G/DL (ref 30–36.5)
MCV RBC AUTO: 95.3 FL (ref 80–99)
METHADONE UR QL: NEGATIVE
MONOCYTES # BLD: 0.5 K/UL (ref 0–1)
MONOCYTES NFR BLD: 5 % (ref 5–13)
NEUTS SEG # BLD: 5.2 K/UL (ref 1.8–8)
NEUTS SEG NFR BLD: 50 % (ref 32–75)
NRBC # BLD: 0 K/UL (ref 0–0.01)
NRBC BLD-RTO: 0 PER 100 WBC
NT PRO BNP: 870 PG/ML
OPIATES UR QL: NEGATIVE
PCP UR QL: NEGATIVE
PLATELET # BLD AUTO: 247 K/UL (ref 150–400)
PMV BLD AUTO: 10.9 FL (ref 8.9–12.9)
POTASSIUM SERPL-SCNC: 3.9 MMOL/L (ref 3.5–5.1)
PROCALCITONIN SERPL-MCNC: <0.05 NG/ML
PROT SERPL-MCNC: 7.4 G/DL (ref 6.4–8.2)
PROTHROMBIN TIME: 11.4 SEC (ref 9–11.1)
RBC # BLD AUTO: 4.86 M/UL (ref 4.1–5.7)
RBC MORPH BLD: ABNORMAL
RSV RNA SPEC QL NAA+PROBE: NOT DETECTED
RV+EV RNA SPEC QL NAA+PROBE: NOT DETECTED
SARS-COV-2 RNA RESP QL NAA+PROBE: NOT DETECTED
SODIUM SERPL-SCNC: 144 MMOL/L (ref 136–145)
SPECIMEN HOLD: NORMAL
SPECIMEN HOLD: NORMAL
THERAPEUTIC RANGE: NORMAL SECS (ref 58–77)
TIBC SERPL-MCNC: 253 UG/DL (ref 250–450)
TROPONIN I SERPL HS-MCNC: 31 NG/L (ref 0–76)
WBC # BLD AUTO: 10.3 K/UL (ref 4.1–11.1)

## 2023-08-23 PROCEDURE — 83550 IRON BINDING TEST: CPT

## 2023-08-23 PROCEDURE — 99285 EMERGENCY DEPT VISIT HI MDM: CPT

## 2023-08-23 PROCEDURE — 93005 ELECTROCARDIOGRAM TRACING: CPT | Performed by: STUDENT IN AN ORGANIZED HEALTH CARE EDUCATION/TRAINING PROGRAM

## 2023-08-23 PROCEDURE — 82077 ASSAY SPEC XCP UR&BREATH IA: CPT

## 2023-08-23 PROCEDURE — 83880 ASSAY OF NATRIURETIC PEPTIDE: CPT

## 2023-08-23 PROCEDURE — 83540 ASSAY OF IRON: CPT

## 2023-08-23 PROCEDURE — 85025 COMPLETE CBC W/AUTO DIFF WBC: CPT

## 2023-08-23 PROCEDURE — 84145 PROCALCITONIN (PCT): CPT

## 2023-08-23 PROCEDURE — 36415 COLL VENOUS BLD VENIPUNCTURE: CPT

## 2023-08-23 PROCEDURE — 80307 DRUG TEST PRSMV CHEM ANLYZR: CPT

## 2023-08-23 PROCEDURE — 85730 THROMBOPLASTIN TIME PARTIAL: CPT

## 2023-08-23 PROCEDURE — 85018 HEMOGLOBIN: CPT

## 2023-08-23 PROCEDURE — 85014 HEMATOCRIT: CPT

## 2023-08-23 PROCEDURE — G0378 HOSPITAL OBSERVATION PER HR: HCPCS

## 2023-08-23 PROCEDURE — 84484 ASSAY OF TROPONIN QUANT: CPT

## 2023-08-23 PROCEDURE — 85610 PROTHROMBIN TIME: CPT

## 2023-08-23 PROCEDURE — 0202U NFCT DS 22 TRGT SARS-COV-2: CPT

## 2023-08-23 PROCEDURE — 82728 ASSAY OF FERRITIN: CPT

## 2023-08-23 PROCEDURE — 71046 X-RAY EXAM CHEST 2 VIEWS: CPT

## 2023-08-23 PROCEDURE — 6370000000 HC RX 637 (ALT 250 FOR IP): Performed by: INTERNAL MEDICINE

## 2023-08-23 PROCEDURE — 2580000003 HC RX 258: Performed by: INTERNAL MEDICINE

## 2023-08-23 PROCEDURE — 6360000004 HC RX CONTRAST MEDICATION: Performed by: RADIOLOGY

## 2023-08-23 PROCEDURE — 85379 FIBRIN DEGRADATION QUANT: CPT

## 2023-08-23 PROCEDURE — 80053 COMPREHEN METABOLIC PANEL: CPT

## 2023-08-23 PROCEDURE — 71275 CT ANGIOGRAPHY CHEST: CPT

## 2023-08-23 RX ORDER — SODIUM CHLORIDE 0.9 % (FLUSH) 0.9 %
5-40 SYRINGE (ML) INJECTION PRN
Status: DISCONTINUED | OUTPATIENT
Start: 2023-08-23 | End: 2023-08-24 | Stop reason: HOSPADM

## 2023-08-23 RX ORDER — ONDANSETRON 2 MG/ML
4 INJECTION INTRAMUSCULAR; INTRAVENOUS EVERY 6 HOURS PRN
Status: DISCONTINUED | OUTPATIENT
Start: 2023-08-23 | End: 2023-08-24 | Stop reason: HOSPADM

## 2023-08-23 RX ORDER — ONDANSETRON 4 MG/1
4 TABLET, ORALLY DISINTEGRATING ORAL EVERY 8 HOURS PRN
Status: DISCONTINUED | OUTPATIENT
Start: 2023-08-23 | End: 2023-08-24 | Stop reason: HOSPADM

## 2023-08-23 RX ORDER — ASPIRIN 81 MG/1
81 TABLET ORAL DAILY
Qty: 30 TABLET | Refills: 3 | Status: SHIPPED
Start: 2023-08-26

## 2023-08-23 RX ORDER — POLYETHYLENE GLYCOL 3350 17 G/17G
17 POWDER, FOR SOLUTION ORAL DAILY PRN
Status: DISCONTINUED | OUTPATIENT
Start: 2023-08-23 | End: 2023-08-24 | Stop reason: HOSPADM

## 2023-08-23 RX ORDER — ACETAMINOPHEN 650 MG/1
650 SUPPOSITORY RECTAL EVERY 6 HOURS PRN
Status: DISCONTINUED | OUTPATIENT
Start: 2023-08-23 | End: 2023-08-24 | Stop reason: HOSPADM

## 2023-08-23 RX ORDER — ACETAMINOPHEN 325 MG/1
650 TABLET ORAL EVERY 6 HOURS PRN
Status: DISCONTINUED | OUTPATIENT
Start: 2023-08-23 | End: 2023-08-24 | Stop reason: HOSPADM

## 2023-08-23 RX ORDER — ROSUVASTATIN CALCIUM 10 MG/1
40 TABLET, COATED ORAL NIGHTLY
Status: DISCONTINUED | OUTPATIENT
Start: 2023-08-23 | End: 2023-08-24 | Stop reason: HOSPADM

## 2023-08-23 RX ORDER — SODIUM CHLORIDE 9 MG/ML
INJECTION, SOLUTION INTRAVENOUS PRN
Status: DISCONTINUED | OUTPATIENT
Start: 2023-08-23 | End: 2023-08-24 | Stop reason: HOSPADM

## 2023-08-23 RX ORDER — SODIUM CHLORIDE 0.9 % (FLUSH) 0.9 %
5-40 SYRINGE (ML) INJECTION EVERY 12 HOURS SCHEDULED
Status: DISCONTINUED | OUTPATIENT
Start: 2023-08-23 | End: 2023-08-24 | Stop reason: HOSPADM

## 2023-08-23 RX ORDER — CARVEDILOL 12.5 MG/1
25 TABLET ORAL 2 TIMES DAILY WITH MEALS
Status: DISCONTINUED | OUTPATIENT
Start: 2023-08-23 | End: 2023-08-24 | Stop reason: HOSPADM

## 2023-08-23 RX ORDER — PAROXETINE HYDROCHLORIDE 20 MG/1
20 TABLET, FILM COATED ORAL DAILY
Status: DISCONTINUED | OUTPATIENT
Start: 2023-08-23 | End: 2023-08-24 | Stop reason: HOSPADM

## 2023-08-23 RX ADMIN — ROSUVASTATIN CALCIUM 40 MG: 10 TABLET, COATED ORAL at 22:54

## 2023-08-23 RX ADMIN — SACUBITRIL AND VALSARTAN 1 TABLET: 24; 26 TABLET, FILM COATED ORAL at 09:00

## 2023-08-23 RX ADMIN — SODIUM CHLORIDE, PRESERVATIVE FREE 10 ML: 5 INJECTION INTRAVENOUS at 22:54

## 2023-08-23 RX ADMIN — CARVEDILOL 25 MG: 12.5 TABLET, FILM COATED ORAL at 08:59

## 2023-08-23 RX ADMIN — SODIUM CHLORIDE, PRESERVATIVE FREE 10 ML: 5 INJECTION INTRAVENOUS at 09:00

## 2023-08-23 RX ADMIN — IOPAMIDOL 80 ML: 755 INJECTION, SOLUTION INTRAVENOUS at 05:57

## 2023-08-23 RX ADMIN — CARVEDILOL 25 MG: 12.5 TABLET, FILM COATED ORAL at 19:45

## 2023-08-23 RX ADMIN — PAROXETINE HYDROCHLORIDE 20 MG: 20 TABLET, FILM COATED ORAL at 09:00

## 2023-08-23 RX ADMIN — SACUBITRIL AND VALSARTAN 1 TABLET: 24; 26 TABLET, FILM COATED ORAL at 22:54

## 2023-08-23 ASSESSMENT — PAIN SCALES - GENERAL: PAINLEVEL_OUTOF10: 0

## 2023-08-23 ASSESSMENT — PAIN - FUNCTIONAL ASSESSMENT: PAIN_FUNCTIONAL_ASSESSMENT: NONE - DENIES PAIN

## 2023-08-23 NOTE — DISCHARGE INSTRUCTIONS
Patient Discharge Instructions    May Brambila / [id number] : 1955    Admitted 2023 Discharged: 2023     Primary Diagnoses  @Rprob@    Take Home Medications     It is important that you take the medication exactly as they are prescribed. Keep your medication in the bottles provided by the pharmacist and keep a list of the medication names, dosages, and times to be taken in your wallet. Do not take other medications without consulting your doctor. What to do at Home    Recommended diet: cardiac diet    Recommended activity: activity as tolerated    If you experience worse symptoms, please follow up with your PCP. Follow-up with your PCP in a few weeks         Coughing Up Blood: Care Instructions  Your Care Instructions     Coughing up blood can be frightening. The blood may come from the lungs, stomach, or throat. You may cough up a few thin streaks of bright red blood. This probably is not a cause for concern. Coughing up large amounts of bright red blood or rust-colored mucus from the lungs can be a symptom of a more serious condition. Several conditions can make you cough up blood from the lungs. These include bronchitis and pneumonia, or more serious problems such as cancer or a blood clot in the lung (pulmonary embolus). Depending on what is causing your cough, it may go away after the illness is treated. Your doctor may tell you not to suppress the cough with cough medicine if it is better for you to cough up the blood and spit it out. Follow-up care is a key part of your treatment and safety. Be sure to make and go to all appointments, and call your doctor if you are having problems. It's also a good idea to know your test results and keep a list of the medicines you take. How can you care for yourself at home? Make a note of when and for how long you cough up blood. Also note if you are coughing up spit with a small amount of blood, or mostly blood.  Take this information

## 2023-08-23 NOTE — ED NOTES
Bedside shift change report completed with Benson Saez RN (oncoming nurse) to include sbar, plan of care     Amari Castro RN  08/23/23 6419

## 2023-08-23 NOTE — ED NOTES
TRANSFER - OUT REPORT:    Verbal report given to Candi Gonzalez RN on Antwon Pearce  being transferred to Orth/ 4th floor for routine progression of patient care       Report consisted of patient's Situation, Background, Assessment and   Recommendations(SBAR). Information from the following report(s) Nurse Handoff Report, Index, ED Encounter Summary, ED SBAR, Adult Overview, and Recent Results was reviewed with the receiving nurse. [place] Fall Assessment:                           Lines:   Peripheral IV 08/23/23 Right Antecubital (Active)        Opportunity for questions and clarification was provided.       Patient transported with:  Carla Snyder RN  08/23/23 5984

## 2023-08-23 NOTE — ED TRIAGE NOTES
Pt reports coughing up bright red blood, symptoms started about an hour ago.    Also reports chest discomfort, thinks its from coughing   Denies SOB, fever or nausea and vomiting     Pt is on Aspirin and Eliquis

## 2023-08-23 NOTE — PLAN OF CARE
Problem: Discharge Planning  Goal: Discharge to home or other facility with appropriate resources  Outcome: Progressing     Problem: Risk for Elopement  Goal: Patient will not exit the unit/facility without proper excort  Outcome: Progressing

## 2023-08-23 NOTE — DISCHARGE SUMMARY
Physician Discharge Summary     Patient ID:  Jayna Cantu  [id number]  76 y.o.  1955    Admit date: 8/23/2023    Discharge date of service and time: 8/23/2023  Greater than 30 minutes were spent providing discharge related services for this patient    Admission Diagnoses: Hemoptysis [R04.2]  Current use of long term anticoagulation [Z79.01]  History of pulmonary embolus (PE) [Z86.711]    Discharge Diagnoses:    Principal Diagnosis   Hemoptysis                                             Hospital Course and other diagnoses  Hemoptysis - POA, unclear source (Nasal, GI, lung). Already resolved after in ER. CTA chest without source. Regardless of source, likely a side effect of Eliquis and ASA. Stop Eliquis. Hold ASA. Monitor Hb. No further bleeding and Hb stable at discharge. Hx pulmonary embolism / Hx of deep venous thrombosis / Chronic anticoagulation - POA. Dx 1 month ago. Provoked by long car ride. Today no PE on CTA and last month small DVT. Stop eliquis. No further testing or treatment. CHF (congestive heart failure), NYHA class I, chronic, systolic / Biventricular ICD (implantable cardioverter-defibrillator) in place / Ischemic cardiomyopathy - POA and stable. Resume BB and ENtresto     Coronary artery disease involving native heart / PAD (peripheral artery disease) - Continue coreg, rosuvastatin, and resume ASA in 3 days      CKD (chronic kidney disease) stage 3 / Renal artery stenosis - Cr stable. PCP to follow. Taking valsartan      Panic attacks - Continue paroxetine. PCP: Zbigniew Headley MD    Consults: none    Significant Diagnostic Studies: See Hospital Course    Discharged home in improved condition.     Discharge Exam:  Gen:  Well-developed, well-nourished, in no acute distress  HEENT:  Pink conjunctivae, PERRL, hearing intact to voice, moist mucous membranes  Neck:  Supple, without masses, thyroid non-tender  Resp:  No accessory muscle use, clear breath sounds without wheezes

## 2023-08-23 NOTE — ED PROVIDER NOTES
[facility] EMERGENCY DEPT  EMERGENCY DEPARTMENT ENCOUNTER      Pt Name: Marky Razo  MRN: [id number]  [address] [facility] 1955  Date of evaluation: 8/23/2023  Provider: Mague Tapia MD    CHIEF COMPLAINT       Chief Complaint   Patient presents with    Hematemesis         HISTORY OF PRESENT ILLNESS    Review of Medical Records: N/A    Nursing Notes were reviewed. HPI    Marky Razo is a 76 y.o. male with a history of peripheral artery disease, coronary artery disease status post CABG, CHF, dysrhythmia status post ICD placement, recent admission for DVT and bilateral PEs in July 2023 who presents to the emergency department for evaluation of hemoptysis. Patient states that he woke up around 03 40 and felt like he had a gurgling sensation in his chest.  States that he has had a chronic cough that is usually dry, but up about an hour ago began coughing up bright red blood. States that he is currently taking aspirin and Eliquis for his history of CAD, DVT, and PE. States that his cough usually improves with Tylenol. Denies any known sick contacts, shortness of breath, fevers, abdominal pain, nausea, or vomiting. Denies any recent epistaxis or hx of GI bleeding. PAST MEDICAL HISTORY     Past Medical History:   Diagnosis Date    CAD (coronary artery disease)     Carotid artery disease ([address])     ICD (implantable cardioverter-defibrillator) in place     Panic attacks 8/3/2023    Tobacco abuse          SURGICAL HISTORY       Past Surgical History:   Procedure Laterality Date    ABLATION OF DYSRHYTHMIC FOCUS  2021    CARDIAC CATHETERIZATION      CORONARY ANGIOPLASTY WITH STENT PLACEMENT      CORONARY ARTERY BYPASS GRAFT      PACEMAKER           CURRENT MEDICATIONS       Previous Medications    APIXABAN (ELIQUIS) 5 MG TABS TABLET    Take 2 tabs of 5 mg (10 mg) twice a day for 6 days, then followed by 1 tab of 5 mg twice a day for 22 days. Indicated: acute PE. Will need refills for 3-6 months.     ASPIRIN below, none     Procedures      FINAL IMPRESSION    No diagnosis found.       DISPOSITION/PLAN   DISPOSITION      Admit      (Please note that portions of this note were completed with a voice recognition program.  Efforts were made to edit the dictations but occasionally words are mis-transcribed.)    Anne Connors MD (electronically signed)  Emergency Attending Physician       Anne Connors MD  08/24/23 1888

## 2023-08-23 NOTE — H&P
[address] [place] [address] Adult  Hospitalist Group    Admission Note    Phone [phone]                                                                                                                                    NAME:  Rhianna Kaye   :   1955   MRN:  [id number]     PCP:  Von Najera MD     Date/Time of service:  2023 11:24 AM  To assist coordination of care and communication with nursing and staff, this note may be preliminary early in the day, but finalized by end of the day. Subjective:     CHIEF COMPLAINT: hemoptysis     HISTORY OF PRESENT ILLNESS:     Mr. Jose Aguilar is a 76 y.o. male who presented to the Emergency Department complaining of hemoptysis. Noted this AM.  Perhaps a few tablespoons of blood. No pain, no fever, no dyspnea. ER workup normal.  Unclear source. 1 month ago placed on Eliquis for provoked PE/DVT. CTA today finds no residual PE. We will admit him for observation.     Past Medical History:   Diagnosis Date    CAD (coronary artery disease)     Carotid artery disease ([facility])     CHF (congestive heart failure), NYHA class I, chronic, systolic ([facility])     CKD (chronic kidney disease) stage 3, GFR 30-59 ml/min ([facility])     Hx of deep venous thrombosis     travel provoked    Hx pulmonary embolism     travel provoked    ICD (implantable cardioverter-defibrillator) in place     Panic attacks 2023    Tobacco abuse         Past Surgical History:   Procedure Laterality Date    ABLATION OF DYSRHYTHMIC FOCUS      CARDIAC CATHETERIZATION      CORONARY ANGIOPLASTY WITH STENT PLACEMENT      CORONARY ARTERY BYPASS GRAFT      PACEMAKER         Social History     Tobacco Use    Smoking status: Every Day     Packs/day: 0.50     Types: Cigarettes    Smokeless tobacco: Never   Substance Use Topics    Alcohol use: Yes     Comment: occassionally        Family History   Problem Relation Age of Onset    Coronary Art Dis Mother     Coronary Art Dis Father     Stroke Brother

## 2023-08-24 VITALS
BODY MASS INDEX: 28.89 KG/M2 | DIASTOLIC BLOOD PRESSURE: 86 MMHG | OXYGEN SATURATION: 96 % | RESPIRATION RATE: 18 BRPM | SYSTOLIC BLOOD PRESSURE: 151 MMHG | HEIGHT: 74 IN | TEMPERATURE: 97.9 F | HEART RATE: 66 BPM | WEIGHT: 225.09 LBS

## 2023-08-24 LAB
ALBUMIN SERPL-MCNC: 3 G/DL (ref 3.5–5)
ALBUMIN/GLOB SERPL: 0.7 (ref 1.1–2.2)
ALP SERPL-CCNC: 74 U/L (ref 45–117)
ALT SERPL-CCNC: 18 U/L (ref 12–78)
ANION GAP SERPL CALC-SCNC: 2 MMOL/L (ref 5–15)
AST SERPL-CCNC: 15 U/L (ref 15–37)
BILIRUB SERPL-MCNC: 0.5 MG/DL (ref 0.2–1)
BUN SERPL-MCNC: 17 MG/DL (ref 6–20)
BUN/CREAT SERPL: 14 (ref 12–20)
CALCIUM SERPL-MCNC: 8.5 MG/DL (ref 8.5–10.1)
CHLORIDE SERPL-SCNC: 114 MMOL/L (ref 97–108)
CO2 SERPL-SCNC: 27 MMOL/L (ref 21–32)
CREAT SERPL-MCNC: 1.23 MG/DL (ref 0.7–1.3)
ERYTHROCYTE [DISTWIDTH] IN BLOOD BY AUTOMATED COUNT: 14.2 % (ref 11.5–14.5)
GLOBULIN SER CALC-MCNC: 4.2 G/DL (ref 2–4)
GLUCOSE SERPL-MCNC: 100 MG/DL (ref 65–100)
HCT VFR BLD AUTO: 44.2 % (ref 36.6–50.3)
HGB BLD-MCNC: 14.4 G/DL (ref 12.1–17)
MAGNESIUM SERPL-MCNC: 2.3 MG/DL (ref 1.6–2.4)
MCH RBC QN AUTO: 30.4 PG (ref 26–34)
MCHC RBC AUTO-ENTMCNC: 32.6 G/DL (ref 30–36.5)
MCV RBC AUTO: 93.2 FL (ref 80–99)
NRBC # BLD: 0 K/UL (ref 0–0.01)
NRBC BLD-RTO: 0 PER 100 WBC
PHOSPHATE SERPL-MCNC: 2.4 MG/DL (ref 2.6–4.7)
PLATELET # BLD AUTO: 226 K/UL (ref 150–400)
PMV BLD AUTO: 10.7 FL (ref 8.9–12.9)
POTASSIUM SERPL-SCNC: 4 MMOL/L (ref 3.5–5.1)
PROT SERPL-MCNC: 7.2 G/DL (ref 6.4–8.2)
RBC # BLD AUTO: 4.74 M/UL (ref 4.1–5.7)
SODIUM SERPL-SCNC: 143 MMOL/L (ref 136–145)
WBC # BLD AUTO: 11 K/UL (ref 4.1–11.1)

## 2023-08-24 PROCEDURE — 85027 COMPLETE CBC AUTOMATED: CPT

## 2023-08-24 PROCEDURE — 83735 ASSAY OF MAGNESIUM: CPT

## 2023-08-24 PROCEDURE — 6370000000 HC RX 637 (ALT 250 FOR IP): Performed by: INTERNAL MEDICINE

## 2023-08-24 PROCEDURE — 80053 COMPREHEN METABOLIC PANEL: CPT

## 2023-08-24 PROCEDURE — 6370000000 HC RX 637 (ALT 250 FOR IP)

## 2023-08-24 PROCEDURE — 84100 ASSAY OF PHOSPHORUS: CPT

## 2023-08-24 PROCEDURE — 1100000000 HC RM PRIVATE

## 2023-08-24 PROCEDURE — 2580000003 HC RX 258: Performed by: INTERNAL MEDICINE

## 2023-08-24 PROCEDURE — G0378 HOSPITAL OBSERVATION PER HR: HCPCS

## 2023-08-24 PROCEDURE — 36415 COLL VENOUS BLD VENIPUNCTURE: CPT

## 2023-08-24 RX ORDER — BENZONATATE 100 MG/1
100 CAPSULE ORAL 3 TIMES DAILY PRN
Status: DISCONTINUED | OUTPATIENT
Start: 2023-08-24 | End: 2023-08-24 | Stop reason: HOSPADM

## 2023-08-24 RX ADMIN — SODIUM CHLORIDE, PRESERVATIVE FREE 10 ML: 5 INJECTION INTRAVENOUS at 08:39

## 2023-08-24 RX ADMIN — SACUBITRIL AND VALSARTAN 1 TABLET: 24; 26 TABLET, FILM COATED ORAL at 08:38

## 2023-08-24 RX ADMIN — BENZONATATE 100 MG: 100 CAPSULE ORAL at 01:20

## 2023-08-24 RX ADMIN — PAROXETINE HYDROCHLORIDE 20 MG: 20 TABLET, FILM COATED ORAL at 08:20

## 2023-08-24 RX ADMIN — BENZONATATE 100 MG: 100 CAPSULE ORAL at 11:45

## 2023-08-24 ASSESSMENT — PAIN SCALES - GENERAL: PAINLEVEL_OUTOF10: 0

## 2023-08-24 NOTE — PROGRESS NOTES
Patient discharged home. AVS printed and reviewed. Patient stated understanding with no questions or concerns. Patient drove himself home. IV removed.

## 2023-08-24 NOTE — PLAN OF CARE
Problem: Discharge Planning  Goal: Discharge to home or other facility with appropriate resources  Outcome: Completed     Problem: Risk for Elopement  Goal: Patient will not exit the unit/facility without proper excort  Outcome: Completed     Problem: Chronic Conditions and Co-morbidities  Goal: Patient's chronic conditions and co-morbidity symptoms are monitored and maintained or improved  Outcome: Completed

## 2023-08-24 NOTE — CONSULTS
Name: [facility]: [address] [place] [address]   : 1955 Admit Date: 2023   Phone: [phone]  Room: [facility]/   PCP: Joe Mitchell MD  MRN: [id number]   Date: 2023  Code: Full Code          Chart and notes reviewed. Data reviewed. I review the patient's current medications in the medical record at each encounter. I have evaluated and examined the patient. HPI:    4:35 PM       History was obtained from patient. I was asked by Colleen Prieto MD to see Elijah Michele in consultation for a chief complaint of hemoptysis. History of Present Illness:  Mr. Alessandro Davidson is a 75 yo gentleman with a history of CAD, carotid artery disease, ICD, CHF, CKD, tobacco use and recent provoked PE who presents with hemoptysis. He describes several weeks of dry cough and yesterday morning noted some hemoptysis. He describes several tablespoons mixed with blood and this has decreased throughout the day. Has had some blood tinged/streaked sputum today and has also noticed some blood when blew his nose earlier today. He has had some mild allergy in the past, no recurrent sinusitis. He denies history of prior lung disease.      Labs: cr 1.23, Hgb 14.4, INR 1.1, RVP negative    Images reviewed:  CTA chest 2023: negative for PE or source of bleed, small R pleural effusion      Past Medical History:   Diagnosis Date    CAD (coronary artery disease)     Carotid artery disease ([facility])     CHF (congestive heart failure), NYHA class I, chronic, systolic ([facility])     CKD (chronic kidney disease) stage 3, GFR 30-59 ml/min ([facility])     Hx of deep venous thrombosis     travel provoked    Hx pulmonary embolism     travel provoked    ICD (implantable cardioverter-defibrillator) in place     Panic attacks 2023    Tobacco abuse        Past Surgical History:   Procedure Laterality Date    ABLATION OF DYSRHYTHMIC FOCUS      CARDIAC CATHETERIZATION      CORONARY ANGIOPLASTY WITH STENT PLACEMENT      CORONARY

## 2023-08-24 NOTE — CARE COORDINATION
8/24/2023  12:05 PM  Care Management Assessment      Reason for Re-Admission: Emergency - Pt is coughing up blood. Hemoptysis [R04.2]  Current use of long term anticoagulation [Z79.01]  History of pulmonary embolus (PE) [Z86.711]         RUR: Readmission Risk              Risk of Unplanned Readmission:  10       Low Risk / upgraded from OBS / Level 1    Prior admission:    7/27/23-7/28/23: Cardiomegaly      -Discharged with no CM needs. Risk Level: [x]Low []Moderate []High  Value-based purchasing: [] Yes [x] No    Advance Directive: Full Code     [x] No AD on file. [] AD on file. [] Current AD not on file. Copy requested. [] Requests AD, and referral submitted to [facility]. Healthcare Decision Maker: [employer] [facility]        Assessment:    Age: 76 y.o. Sex: [x] Male []Female        08/24/23 1202   Service Assessment   Patient Orientation Alert and Oriented   Cognition Alert   History Provided By Patient;Medical Record   Primary [address] is: Legal Next of Kin   PCP Verified by CM Yes   Last Visit to PCP Within last 3 months   Prior Functional Level Independent in ADLs/IADLs   Current Functional Level Independent in ADLs/IADLs   Can patient return to prior living arrangement Yes   Ability to make needs known: Good   Family able to assist with home care needs: Other (comment)  (NA)   Would you like for me to discuss the discharge plan with any other family members/significant others, and if so, who?  No   Financial Resources Medicare   Community Resources None   Social/Functional History   Lives With Alone   Type of [address] [facility] One level   Home Equipment None   Receives Help From Family   ADL Assistance Independent   Homemaking Assistance Independent   Ambulation Assistance Independent   Transfer Assistance Independent   Active  Yes   Mode of Transportation Car   Occupation Retired   Discharge Planning

## 2023-08-25 PROCEDURE — G2066 INTER DEVC REMOTE 30D: HCPCS | Performed by: INTERNAL MEDICINE

## 2023-08-25 PROCEDURE — 93297 REM INTERROG DEV EVAL ICPMS: CPT | Performed by: INTERNAL MEDICINE

## 2023-09-04 DIAGNOSIS — I26.99 PULMONARY EMBOLISM, BILATERAL (HCC): Primary | ICD-10-CM

## 2023-09-22 SDOH — ECONOMIC STABILITY: FOOD INSECURITY: WITHIN THE PAST 12 MONTHS, THE FOOD YOU BOUGHT JUST DIDN'T LAST AND YOU DIDN'T HAVE MONEY TO GET MORE.: NEVER TRUE

## 2023-09-22 SDOH — ECONOMIC STABILITY: HOUSING INSECURITY
IN THE LAST 12 MONTHS, WAS THERE A TIME WHEN YOU DID NOT HAVE A STEADY PLACE TO SLEEP OR SLEPT IN A SHELTER (INCLUDING NOW)?: NO

## 2023-09-22 SDOH — ECONOMIC STABILITY: TRANSPORTATION INSECURITY
IN THE PAST 12 MONTHS, HAS LACK OF TRANSPORTATION KEPT YOU FROM MEETINGS, WORK, OR FROM GETTING THINGS NEEDED FOR DAILY LIVING?: NO

## 2023-09-22 SDOH — HEALTH STABILITY: PHYSICAL HEALTH: ON AVERAGE, HOW MANY DAYS PER WEEK DO YOU ENGAGE IN MODERATE TO STRENUOUS EXERCISE (LIKE A BRISK WALK)?: 6 DAYS

## 2023-09-22 SDOH — HEALTH STABILITY: PHYSICAL HEALTH: ON AVERAGE, HOW MANY MINUTES DO YOU ENGAGE IN EXERCISE AT THIS LEVEL?: 90 MIN

## 2023-09-22 SDOH — ECONOMIC STABILITY: INCOME INSECURITY: HOW HARD IS IT FOR YOU TO PAY FOR THE VERY BASICS LIKE FOOD, HOUSING, MEDICAL CARE, AND HEATING?: NOT VERY HARD

## 2023-09-22 SDOH — ECONOMIC STABILITY: FOOD INSECURITY: WITHIN THE PAST 12 MONTHS, YOU WORRIED THAT YOUR FOOD WOULD RUN OUT BEFORE YOU GOT MONEY TO BUY MORE.: NEVER TRUE

## 2023-09-22 ASSESSMENT — PATIENT HEALTH QUESTIONNAIRE - PHQ9
SUM OF ALL RESPONSES TO PHQ9 QUESTIONS 1 & 2: 0
SUM OF ALL RESPONSES TO PHQ QUESTIONS 1-9: 0
SUM OF ALL RESPONSES TO PHQ QUESTIONS 1-9: 0
2. FEELING DOWN, DEPRESSED OR HOPELESS: 0
SUM OF ALL RESPONSES TO PHQ QUESTIONS 1-9: 0
SUM OF ALL RESPONSES TO PHQ QUESTIONS 1-9: 0
1. LITTLE INTEREST OR PLEASURE IN DOING THINGS: 0

## 2023-09-22 ASSESSMENT — LIFESTYLE VARIABLES
HOW MANY STANDARD DRINKS CONTAINING ALCOHOL DO YOU HAVE ON A TYPICAL DAY: 1 OR 2
HOW OFTEN DO YOU HAVE A DRINK CONTAINING ALCOHOL: 2
HOW MANY STANDARD DRINKS CONTAINING ALCOHOL DO YOU HAVE ON A TYPICAL DAY: 1
HOW OFTEN DO YOU HAVE A DRINK CONTAINING ALCOHOL: MONTHLY OR LESS
HOW OFTEN DO YOU HAVE SIX OR MORE DRINKS ON ONE OCCASION: 1

## 2023-09-25 ENCOUNTER — OFFICE VISIT (OUTPATIENT)
Facility: CLINIC | Age: 68
End: 2023-09-25
Payer: MEDICARE

## 2023-09-25 ENCOUNTER — TELEPHONE (OUTPATIENT)
Age: 68
End: 2023-09-25

## 2023-09-25 VITALS
OXYGEN SATURATION: 98 % | HEIGHT: 74 IN | RESPIRATION RATE: 18 BRPM | TEMPERATURE: 97.3 F | DIASTOLIC BLOOD PRESSURE: 73 MMHG | BODY MASS INDEX: 28.83 KG/M2 | SYSTOLIC BLOOD PRESSURE: 112 MMHG | WEIGHT: 224.6 LBS | HEART RATE: 62 BPM

## 2023-09-25 DIAGNOSIS — M54.50 ACUTE RIGHT-SIDED LOW BACK PAIN WITHOUT SCIATICA: Primary | ICD-10-CM

## 2023-09-25 DIAGNOSIS — Z00.00 ROUTINE GENERAL MEDICAL EXAMINATION AT A HEALTH CARE FACILITY: ICD-10-CM

## 2023-09-25 PROCEDURE — 1123F ACP DISCUSS/DSCN MKR DOCD: CPT | Performed by: FAMILY MEDICINE

## 2023-09-25 PROCEDURE — G0439 PPPS, SUBSEQ VISIT: HCPCS | Performed by: FAMILY MEDICINE

## 2023-09-25 PROCEDURE — 99213 OFFICE O/P EST LOW 20 MIN: CPT | Performed by: FAMILY MEDICINE

## 2023-09-25 PROCEDURE — 93297 REM INTERROG DEV EVAL ICPMS: CPT | Performed by: INTERNAL MEDICINE

## 2023-09-25 PROCEDURE — G8419 CALC BMI OUT NRM PARAM NOF/U: HCPCS | Performed by: FAMILY MEDICINE

## 2023-09-25 PROCEDURE — G8427 DOCREV CUR MEDS BY ELIG CLIN: HCPCS | Performed by: FAMILY MEDICINE

## 2023-09-25 PROCEDURE — 3017F COLORECTAL CA SCREEN DOC REV: CPT | Performed by: FAMILY MEDICINE

## 2023-09-25 PROCEDURE — G2066 INTER DEVC REMOTE 30D: HCPCS | Performed by: INTERNAL MEDICINE

## 2023-09-25 PROCEDURE — 4004F PT TOBACCO SCREEN RCVD TLK: CPT | Performed by: FAMILY MEDICINE

## 2023-09-25 RX ORDER — ASPIRIN 81 MG/1
TABLET, CHEWABLE ORAL
COMMUNITY

## 2023-09-25 ASSESSMENT — ENCOUNTER SYMPTOMS: BACK PAIN: 1

## 2023-09-25 NOTE — TELEPHONE ENCOUNTER
----- Message from An Jie Dupree MD sent at 9/25/2023 10:17 AM EDT -----  Remote transmission demonstrated 7 months remaining on battery life. Can we get the patient to FU with NP in 3-4 months and schedule for monthly remote checks. Thanks. Called patient, ID verified using two patient identifiers. Reviewed above with patient. Appointment scheduled.     Future Appointments   Date Time Provider [address] [place] [address]   1/30/2024  9:20 AM RONALD Ewing NPSF BS AMB   3/25/2024  8:00 AM Bon Pedersen MD CCFP BS AMB   6/4/2024  8:40 AM PACEMAKER, STFRANCES KAYLANSCHRISSY BS AMB   6/4/2024  9:00 AM RONALD Ewing NPF BS AMB   6/19/2024  9:00 AM MD VALERIA NicholsF BS AMB   12/13/2024  1:20 PM Radha Dupree MD CAVSF BS AMB

## 2023-09-25 NOTE — PROGRESS NOTES
Subjective:      Patient ID: Bernardino Contreras is a 76 y.o. male. He has had low back pain intermittently for about 6 months which is getting more frequent. Is lasts minutes and is relieved by stretching. It radiates into the posterior thighs. Review of Systems   Constitutional:  Negative for chills and fever. Gastrointestinal:         No bowel incontinence   Genitourinary:         No bladder incontinence   Musculoskeletal:  Positive for back pain. Neurological:  Negative for weakness and numbness. Objective:   Physical Exam  Musculoskeletal:      Lumbar back: No tenderness or bony tenderness. Decreased range of motion. Negative right straight leg raise test and negative left straight leg raise test.        Back:    Neurological:      Sensory: Sensation is intact. Motor: Motor function is intact. Gait: Gait is intact. Deep Tendon Reflexes:      Reflex Scores:       Patellar reflexes are 1+ on the right side and 1+ on the left side. Achilles reflexes are 1+ on the right side and 1+ on the left side. Assessment:       Diagnosis Orders   1. Acute right-sided low back pain without sciatica  [facility] - Physical Therapy at [facility]    XR LUMBAR SPINE (MIN 4 VIEWS)      Plan:          Likely mechanical  L spine x-ray  PT  Advil or Tylenol prh     Return in about 6 months (around 3/25/2024) for panic attacks. Reviewed plan of care. Patient has provided input and agrees with goals.             Edu Urrutia MD

## 2023-09-25 NOTE — PROGRESS NOTES
Medicare Annual Wellness Visit    Primitivo Damon is here for Medicare AWV    Assessment & Plan   Acute right-sided low back pain without sciatica  -     [address] - Physical Therapy at [facility], [address] [facility] (MIN 4 VIEWS); Future  Routine general medical examination at a health care facility  -     External Referral To Ophthalmology    Recommendations for Preventive Services Due: see orders and patient instructions/AVS.  Recommended screening schedule for the next 5-10 years is provided to the patient in written form: see Patient Instructions/AVS.     Return in about 6 months (around 3/25/2024) for panic attacks. Subjective       Patient's complete Health Risk Assessment and screening values have been reviewed and are found in Flowsheets. The following problems were reviewed today and where indicated follow up appointments were made and/or referrals ordered. Positive Risk Factor Screenings with Interventions:                  Dentist Screen:  Have you seen the dentist within the past year?: (!) No    Intervention:  None needed, he is edentulous      Vision Screen:  Do you have difficulty driving, watching TV, or doing any of your daily activities because of your eyesight?: No  Have you had an eye exam within the past year?: (!) No  No results found.     Interventions:   Patient encouraged to make appointment with their eye specialist    Safety:  Do you have either shower bars, grab bars, non-slip mats or non-slip surfaces in your shower or bathtub?: (!) No  Interventions:  referred     Advanced Directives:  Do you have a Living Will?: (!) No    Intervention:  has NO advanced directive - information provided        Tobacco Use:  Tobacco Use: High Risk (9/25/2023)    Patient History     Smoking Tobacco Use: Every Day     Smokeless Tobacco Use: Never     Passive Exposure: Not on file     E-cigarette/Vaping       Questions Responses    E-cigarette/Vaping Use Never User    Start Date     Passive

## 2023-10-06 ENCOUNTER — HOSPITAL ENCOUNTER (OUTPATIENT)
Facility: HOSPITAL | Age: 68
End: 2023-10-06
Payer: MEDICARE

## 2023-10-06 DIAGNOSIS — M54.50 ACUTE RIGHT-SIDED LOW BACK PAIN WITHOUT SCIATICA: ICD-10-CM

## 2023-10-06 PROCEDURE — 72100 X-RAY EXAM L-S SPINE 2/3 VWS: CPT

## 2023-10-10 ENCOUNTER — HOSPITAL ENCOUNTER (OUTPATIENT)
Facility: HOSPITAL | Age: 68
Setting detail: RECURRING SERIES
Discharge: HOME | End: 2023-10-13
Payer: MEDICARE

## 2023-10-10 PROCEDURE — 97161 PT EVAL LOW COMPLEX 20 MIN: CPT | Performed by: PHYSICAL THERAPIST

## 2023-10-10 PROCEDURE — 97535 SELF CARE MNGMENT TRAINING: CPT | Performed by: PHYSICAL THERAPIST

## 2023-10-10 PROCEDURE — 97110 THERAPEUTIC EXERCISES: CPT | Performed by: PHYSICAL THERAPIST

## 2023-10-10 NOTE — THERAPY EVALUATION
ago  Current symptoms/Complaints: Stiffness upon standing from a chair  Mechanism of Injury: Insidious onset  PLOF: Pt enjoys walking, taking his dog out, golfing occasionally  Limitations to PLOF/Activity or Recreational Limitations: Pain and stiffness  Work Hx: Pt is retired  Living Situation: Lives in a one story home  Mobility: Independent  Self Care: Independent  Previous Treatment/Compliance: None  PMHx/Surgical Hx/Comorbidites: triple bypass 200, various stents 96-08  Ablation 2019, Pacemaker replaced x2  Prior Hospitalization:  DVT end of August and early September   Barriers: []pain []Financial []time []transportation []Other:  Substance use: []Alcohol [x]Tobacco []other:   Pt Goals: \"Manage the pain upon standing\"  Motivation: good  Cognition: A & O x 4          OBJECTIVE    Posture:  L shoulder higher  In supine hooklying R knee higher   Gait and Functional Mobility:  Pt can transfer sit <> stand without UE assist  L hip hikes when ambulating    Lumbar AROM:        R  L   Flexion    8 inches \"pulling in my calves\"        Extension   WFL        Side Bending    To joint line B       Rotation   16 inches B            LOWER QUARTER   MUSCLE STRENGTH  KEY       R  L  0 - No Contraction  L1, L2 Psoas  5  5    1 - Trace   L3 Quads  5  5    2 - Poor   L4 Tib Ant  5  5    3 - Fair    L5 EHL  5  5    4 - Good   S1 FHL  5  5    5 - Normal   S2 Hams  5  5               MMT: hip abd/ add WNL  Neurological: Sensation: WNL  Special Tests:        SLR: R 50 deg L 45   UMM:+ for pain on L in hip      SLS R 20 s L 15 s    HAdDT +L   PART +R      Objective/Functional Outcome Measure: lumbar  FOTO Score:74  FOTO score = an established functional score where 100 = no disability      25 min [x]Eval - untimed                        Therapeutic Procedures:   Tx Min Billable or 1:1 Min (if diff from Tx Min) Procedure, Rationale, Specifics   10  72953 Therapeutic Exercise (timed):  increase ROM, strength, coordination, balance,

## 2023-10-18 ENCOUNTER — APPOINTMENT (OUTPATIENT)
Facility: HOSPITAL | Age: 68
End: 2023-10-18
Payer: MEDICARE

## 2023-10-25 ENCOUNTER — HOSPITAL ENCOUNTER (OUTPATIENT)
Facility: HOSPITAL | Age: 68
Setting detail: RECURRING SERIES
Discharge: HOME | End: 2023-10-28
Payer: MEDICARE

## 2023-10-25 PROCEDURE — 97110 THERAPEUTIC EXERCISES: CPT

## 2023-10-25 PROCEDURE — 97140 MANUAL THERAPY 1/> REGIONS: CPT

## 2023-10-25 NOTE — PROGRESS NOTES
goals.    Progress toward goals / Updated goals:  []  See Progress Note/Recertification    Patient tolerated today's session well. Will assess how patient feels following today's session as patient usually gets the increase in pain/aching after. Educated patient on proper shoes and to not push too hard when stretch and feel a gentle stretch/pull.       PLAN  YES Continue plan of care  Re-Cert Due: 2/8/12  [x]  Upgrade activities as tolerated  []  Discharge due to :  []  Other:      Jon Parekh PTA       10/25/2023       2:11 PM

## 2023-10-31 ENCOUNTER — PATIENT MESSAGE (OUTPATIENT)
Facility: CLINIC | Age: 68
End: 2023-10-31

## 2023-10-31 RX ORDER — PAROXETINE HYDROCHLORIDE 20 MG/1
20 TABLET, FILM COATED ORAL DAILY
Qty: 30 TABLET | Refills: 3 | Status: SHIPPED | OUTPATIENT
Start: 2023-10-31

## 2023-10-31 NOTE — TELEPHONE ENCOUNTER
From: Melania Ochoa  To: Dr. Ramos Ribera: 10/31/2023 2:57 PM EDT  Subject: Prescription Refill    Can you please refill my prescription for Paxil, [facility] [address]?

## 2023-11-01 ENCOUNTER — HOSPITAL ENCOUNTER (OUTPATIENT)
Facility: HOSPITAL | Age: 68
Setting detail: RECURRING SERIES
Discharge: HOME | End: 2023-11-04
Payer: MEDICARE

## 2023-11-01 PROCEDURE — 97110 THERAPEUTIC EXERCISES: CPT

## 2023-11-01 NOTE — PROGRESS NOTES
PHYSICAL THERAPY - MEDICARE DAILY TREATMENT NOTE (updated 3/23)      Date: 2023          Patient Name:  Rhianna Kaye :  1955   Medical   Diagnosis:  Low back pain, unspecified [M54.50] Treatment Diagnosis:  M54.59  OTHER LOWER BACK PAIN    Referral Source:  Bon Pedersen MD Insurance:   Payor: MEDICARE / Plan: MEDICARE PART A AND B / Product Type: *No Product type* /                     Patient  verified YES    Visit #   Current  / Total 3 Med nec   Time   In / Out 1215p 105p   Total Treatment Time 50   Total Timed Codes 40   1:1 Treatment Time 40      [facility] Totals Reminder:  bill using total billable   min of TIMED therapeutic procedures and modalities. 8-22 min = 1 unit; 23-37 min = 2 units; 38-52 min = 3 units; 53-67 min = 4 units; 68-82 min = 5 units            SUBJECTIVE    Pain Level (0-10 scale): 0/10    Any medication changes, allergies to medications, adverse drug reactions, diagnosis change, or new procedure performed?: [x] No    [] Yes (see summary sheet for update)  Medications: Verified on Patient Summary List    Subjective functional status/changes:     Patient reported he felt much better after last visit and got new shoes and that has helped a lot too. Patient stated significantly less instances of pain since last visit. Feeling like he can walk better. OBJECTIVE      Therapeutic Procedures: Tx Min Billable or 1:1 Min (if diff from Tx Min) Procedure, Rationale, Specifics   40  54605 Therapeutic Exercise (timed):  increase ROM, strength, coordination, balance, and proprioception to improve patient's ability to progress to PLOF and address remaining functional goals. (see flow sheet as applicable)     Details if applicable:       17758 Manual Therapy (timed):  decrease pain, increase ROM, and increase tissue extensibility to improve patient's ability to progress to PLOF and address remaining functional goals.   The manual therapy interventions were performed at a separate and

## 2023-11-08 ENCOUNTER — HOSPITAL ENCOUNTER (OUTPATIENT)
Facility: HOSPITAL | Age: 68
Setting detail: RECURRING SERIES
Discharge: HOME | End: 2023-11-11
Payer: MEDICARE

## 2023-11-08 PROCEDURE — 97110 THERAPEUTIC EXERCISES: CPT | Performed by: PHYSICAL THERAPIST

## 2023-11-08 NOTE — PROGRESS NOTES
PHYSICAL THERAPY - MEDICARE DAILY TREATMENT NOTE (updated 3/23)      Date: 2023          Patient Name:  Primitivo Damon :  1955   Medical   Diagnosis:  Low back pain, unspecified [M54.50] Treatment Diagnosis:  M54.59  OTHER LOWER BACK PAIN    Referral Source:  Sagar Mixon MD Insurance:   Payor: MEDICARE / Plan: MEDICARE PART A AND B / Product Type: *No Product type* /                     Patient  verified YES    Visit #   Current  / Total 4 Med nec   Time   In / Out 1105 AM 1155 AM   Total Treatment Time 50   Total Timed Codes 40   1:1 Treatment Time 40      [facility] Totals Reminder:  bill using total billable   min of TIMED therapeutic procedures and modalities. 8-22 min = 1 unit; 23-37 min = 2 units; 38-52 min = 3 units; 53-67 min = 4 units; 68-82 min = 5 units            SUBJECTIVE    Pain Level (0-10 scale): 0/10    Any medication changes, allergies to medications, adverse drug reactions, diagnosis change, or new procedure performed?: [x] No    [] Yes (see summary sheet for update)  Medications: Verified on Patient Summary List    Subjective functional status/changes:     Patient reports he is feeling much better since his second visit \"I don't have much pain\"    OBJECTIVE      Therapeutic Procedures: Tx Min Billable or 1:1 Min (if diff from Tx Min) Procedure, Rationale, Specifics   40  69902 Therapeutic Exercise (timed):  increase ROM, strength, coordination, balance, and proprioception to improve patient's ability to progress to PLOF and address remaining functional goals. (see flow sheet as applicable)     Details if applicable:       14502 Manual Therapy (timed):  decrease pain, increase ROM, and increase tissue extensibility to improve patient's ability to progress to PLOF and address remaining functional goals. The manual therapy interventions were performed at a separate and distinct time from the therapeutic activities interventions .  (see flow sheet as applicable)     Details if

## 2023-11-08 NOTE — PROGRESS NOTES
Physical Therapy at [facility],   a part of [address] [place], [place] [address]  Phone: [phone]  Fax: [phone]  PHYSICAL THERAPY PROGRESS NOTE  Patient Name:  Carol Lorenzo :  1955   Treatment/Medical Diagnosis: Low back pain, unspecified [M54.50]   Referral Source:  Roland Munoz MD     Date of Initial Visit:  10/10/23 Attended Visits:  4 Missed Visits:  0     SUMMARY OF TREATMENT/ASSESSMENT:  The patient has completed 4 visits. Treatment has included a combination of  therapeutic exercise, neuromuscular re-education, manual therapy, instruction of home exercise program and patient education regarding ADLs/ self care. Modalities including moist heat have been used. The patient is independent with home exercise program at this time. CURRENT STATUS  Pt no longer complains of pain but is limited by weakness, stiffness and imbalance. HE would benefit from continued PT to reach remaining goals. Short Term Goals: To be accomplished in 5 treatments. 1) The patient will be independent with introductory HEP - MET  2) The patient will improve SLS time to 30 seconds B to indicate improved balance and decreased risk of falls - NOT MET  3) The patient will improve lumbar flexion AROM to reaching 5 inches from the floor to improve ease with putting on his pants - NOT MET  Long Term Goals: To be accomplished in 12 treatments.   1) The patient will transfer sit <> stand without an increase in pain throughout the day in his home - MET  2) The patient will demonstrate lumbar flexion AROM to reaching fingertips to toes to improve ease with picking items off the floor - NOT MET  3) The patient will demonstrate - L UMM test to improve ease donning and doffing shoes - NOT MET        RECOMMENDATIONS  Continue PT 1x/week for up to 7 additional visits        Adebayo Packer, PT       2023       11:48 AM    If you have any

## 2023-11-15 ENCOUNTER — APPOINTMENT (OUTPATIENT)
Facility: HOSPITAL | Age: 68
End: 2023-11-15
Payer: MEDICARE

## 2023-11-22 ENCOUNTER — HOSPITAL ENCOUNTER (OUTPATIENT)
Facility: HOSPITAL | Age: 68
Setting detail: RECURRING SERIES
Discharge: HOME | End: 2023-11-25
Payer: MEDICARE

## 2023-11-22 PROCEDURE — 97110 THERAPEUTIC EXERCISES: CPT

## 2023-11-22 NOTE — PROGRESS NOTES
PHYSICAL THERAPY - MEDICARE DAILY TREATMENT NOTE (updated 3/23)      Date: 2023          Patient Name:  Tien Levine :  1955   Medical   Diagnosis:  Low back pain, unspecified [M54.50] Treatment Diagnosis:  M54.59  OTHER LOWER BACK PAIN    Referral Source:  Cachorro Lira MD Insurance:   Payor: MEDICARE / Plan: MEDICARE PART A AND B / Product Type: *No Product type* /                     Patient  verified YES    Visit #   Current  / Total 5 Med nec   Time   In / Out 1000 AM 1050 AM   Total Treatment Time 50   Total Timed Codes 40   1:1 Treatment Time 40      [facility] Totals Reminder:  bill using total billable   min of TIMED therapeutic procedures and modalities. 8-22 min = 1 unit; 23-37 min = 2 units; 38-52 min = 3 units; 53-67 min = 4 units; 68-82 min = 5 units            SUBJECTIVE    Pain Level (0-10 scale): 0/10    Any medication changes, allergies to medications, adverse drug reactions, diagnosis change, or new procedure performed?: [x] No    [] Yes (see summary sheet for update)  Medications: Verified on Patient Summary List    Subjective functional status/changes:     Patient reports he overall has been feeling good with having a few good days and 1 bad day. Patient states sometime his stretches increase in pain but after talking, it was because he was pushing too far into the stretch. OBJECTIVE      Therapeutic Procedures: Tx Min Billable or 1:1 Min (if diff from Tx Min) Procedure, Rationale, Specifics   40  60560 Therapeutic Exercise (timed):  increase ROM, strength, coordination, balance, and proprioception to improve patient's ability to progress to PLOF and address remaining functional goals. (see flow sheet as applicable)     Details if applicable:       95448 Manual Therapy (timed):  decrease pain, increase ROM, and increase tissue extensibility to improve patient's ability to progress to PLOF and address remaining functional goals.   The manual therapy interventions were

## 2023-11-29 ENCOUNTER — HOSPITAL ENCOUNTER (OUTPATIENT)
Facility: HOSPITAL | Age: 68
Setting detail: RECURRING SERIES
Discharge: HOME | End: 2023-12-02
Payer: MEDICARE

## 2023-11-29 PROCEDURE — 97110 THERAPEUTIC EXERCISES: CPT

## 2023-11-29 NOTE — PROGRESS NOTES
goals:  [x]  See Progress Note/Recertification  Short Term Goals: To be accomplished in 5 treatments. 1) The patient will be independent with introductory HEP - MET  2) The patient will improve SLS time to 30 seconds B to indicate improved balance and decreased risk of falls - NOT MET  3) The patient will improve lumbar flexion AROM to reaching 5 inches from the floor to improve ease with putting on his pants - NOT MET  Long Term Goals: To be accomplished in 12 treatments.   1) The patient will transfer sit <> stand without an increase in pain throughout the day in his home - MET  2) The patient will demonstrate lumbar flexion AROM to reaching fingertips to toes to improve ease with picking items off the floor - NOT MET  3) The patient will demonstrate - L UMM test to improve ease donning and doffing shoes - NOT MET      PLAN  YES Continue plan of care  Re-Cert Due: 6/2/34  [x]  Upgrade activities as tolerated  []  Discharge due to :  []  Other:      Trish Aquino, ADALBERTO       11/29/2023       11:02 AM

## 2023-12-06 ENCOUNTER — HOSPITAL ENCOUNTER (OUTPATIENT)
Facility: HOSPITAL | Age: 68
Setting detail: RECURRING SERIES
Discharge: HOME | End: 2023-12-09
Payer: MEDICARE

## 2023-12-06 PROCEDURE — 97110 THERAPEUTIC EXERCISES: CPT

## 2023-12-06 NOTE — PROGRESS NOTES
PHYSICAL THERAPY - MEDICARE DAILY TREATMENT NOTE (updated 3/23)      Date: 2023          Patient Name:  Lynne Cartwright :  1955   Medical   Diagnosis:  Low back pain, unspecified [M54.50] Treatment Diagnosis:  M54.59  OTHER LOWER BACK PAIN    Referral Source:  Jose Silverio MD Insurance:   Payor: MEDICARE / Plan: MEDICARE PART A AND B / Product Type: *No Product type* /                     Patient  verified YES    Visit #   Current  / Total 6 Med nec   Time   In / Out 1130 AM 1220 AM   Total Treatment Time 50   Total Timed Codes 45   1:1 Treatment Time 39      [address] Reminder:  bill using total billable   min of TIMED therapeutic procedures and modalities. 8-22 min = 1 unit; 23-37 min = 2 units; 38-52 min = 3 units; 53-67 min = 4 units; 68-82 min = 5 units            SUBJECTIVE    Pain Level (0-10 scale): 0/10    Any medication changes, allergies to medications, adverse drug reactions, diagnosis change, or new procedure performed?: [x] No    [] Yes (see summary sheet for update)  Medications: Verified on Patient Summary List    Subjective functional status/changes:     Patient reports he was a little sore but more manageable. No pain but some difficulty upon stand after sitting prolong. OBJECTIVE      Therapeutic Procedures: Tx Min Billable or 1:1 Min (if diff from Tx Min) Procedure, Rationale, Specifics   45  61902 Therapeutic Exercise (timed):  increase ROM, strength, coordination, balance, and proprioception to improve patient's ability to progress to PLOF and address remaining functional goals. (see flow sheet as applicable)     Details if applicable:       10309 Manual Therapy (timed):  decrease pain, increase ROM, and increase tissue extensibility to improve patient's ability to progress to PLOF and address remaining functional goals. The manual therapy interventions were performed at a separate and distinct time from the therapeutic activities interventions .  (see flow sheet as

## 2023-12-13 ENCOUNTER — HOSPITAL ENCOUNTER (OUTPATIENT)
Facility: HOSPITAL | Age: 68
Setting detail: RECURRING SERIES
Discharge: HOME | End: 2023-12-16
Payer: MEDICARE

## 2023-12-13 PROCEDURE — 97110 THERAPEUTIC EXERCISES: CPT

## 2023-12-13 NOTE — PROGRESS NOTES
PHYSICAL THERAPY - MEDICARE DAILY TREATMENT NOTE (updated 3/23)      Date: 2023          Patient Name:  Rosa Isela Ochoa :  1955   Medical   Diagnosis:  Low back pain, unspecified [M54.50] Treatment Diagnosis:  M54.59  OTHER LOWER BACK PAIN    Referral Source:  Patricia Guillory MD Insurance:   Payor: MEDICARE / Plan: MEDICARE PART A AND B / Product Type: *No Product type* /                     Patient  verified YES    Visit #   Current  / Total 8 Med nec   Time   In / Out 1000 AM 1050 AM   Total Treatment Time 50   Total Timed Codes 45   1:1 Treatment Time 39      [facility] Totals Reminder:  bill using total billable   min of TIMED therapeutic procedures and modalities. 8-22 min = 1 unit; 23-37 min = 2 units; 38-52 min = 3 units; 53-67 min = 4 units; 68-82 min = 5 units            SUBJECTIVE    Pain Level (0-10 scale): 0/10    Any medication changes, allergies to medications, adverse drug reactions, diagnosis change, or new procedure performed?: [x] No    [] Yes (see summary sheet for update)  Medications: Verified on Patient Summary List    Subjective functional status/changes:     Patient reports continued soreness after last visit but resolved by the next day. OBJECTIVE      Therapeutic Procedures: Tx Min Billable or 1:1 Min (if diff from Tx Min) Procedure, Rationale, Specifics   45  04650 Therapeutic Exercise (timed):  increase ROM, strength, coordination, balance, and proprioception to improve patient's ability to progress to PLOF and address remaining functional goals. (see flow sheet as applicable)     Details if applicable:       88291 Manual Therapy (timed):  decrease pain, increase ROM, and increase tissue extensibility to improve patient's ability to progress to PLOF and address remaining functional goals. The manual therapy interventions were performed at a separate and distinct time from the therapeutic activities interventions .  (see flow sheet as applicable)     Details if

## 2023-12-20 ENCOUNTER — APPOINTMENT (OUTPATIENT)
Facility: HOSPITAL | Age: 68
End: 2023-12-20
Payer: MEDICARE

## 2023-12-27 ENCOUNTER — APPOINTMENT (OUTPATIENT)
Facility: HOSPITAL | Age: 68
End: 2023-12-27
Payer: MEDICARE

## 2024-01-07 PROCEDURE — 93297 REM INTERROG DEV EVAL ICPMS: CPT | Performed by: INTERNAL MEDICINE

## 2024-01-29 NOTE — PROGRESS NOTES
Cardiac Electrophysiology OFFICE Follow Up Note     Primary Cardiologist: Dr. Alejandro    Assessment/Plan:   1. Biventricular ICD (implantable cardioverter-defibrillator) in place  -     CBC; Future  -     Basic Metabolic Panel; Future  2. Chronic systolic congestive heart failure (HCC)  -     CBC; Future  -     Basic Metabolic Panel; Future  3. Coronary artery disease involving native coronary artery of native heart without angina pectoris  -     CBC; Future  -     Basic Metabolic Panel; Future  4. Ventricular tachycardia (HCC)       Ventricular tachycardia  Previously on amiodarone and mexiletine, stopped  2018 with admission 12/2018 ventricular tachycardia. Mexiletine resumed, ICD parameters changed  2/2020 ventricular tachycardia storm  7/2020 ventricular tachycardia ablation  No recurrent sustained VT or ICD shock, now off of AAD  - cont Carvedilol  - remote tranmission every 3 months  - monthly thoracic impedance transmission      ICD implant   Initial implant 2008, biventricular ICD 2017  [employer]. CRT-D with normal function.  Transmission 1/20/24:  3 months on battery   62% A-paced  96% BiV pacing  No AF  No VT/VF or therapies  - Schedule for generator change. The procedure was reviewed and all questions were answered.     Coronary artery disease  1994 non-STEMI  2000 CABG LIMA LAD, SVG RCA, SVG OMB  2008? LHC: LIMA to LAD occluded, stent to LAD and CX. (No report available)   10/2017 nuc stress: EF 38%, large partially rev infapical defect c/w MI, mild rbady-infarct ischemia  12/2018 admission with chest discomfort and ventricular tachycardia  12/2018 LHC: LM nl, LAD 40%, CX irreg, OM1 70% small vessel, %,    LIMA to LAD occluded, SVG %, SVG %. Medical mgnt  2/2020 nuclear stress test: Large fixed defect LAD/RCA, EF 25%.  No ischemia  doing well without angina.  Continue medical management.     3/2023 pharma nuc arge fixed inferior apical defect with no reversibility, EF 18%.

## 2024-01-30 ENCOUNTER — TELEPHONE (OUTPATIENT)
Age: 69
End: 2024-01-30

## 2024-01-30 ENCOUNTER — OFFICE VISIT (OUTPATIENT)
Age: 69
End: 2024-01-30
Payer: MEDICARE

## 2024-01-30 VITALS
SYSTOLIC BLOOD PRESSURE: 128 MMHG | HEIGHT: 74 IN | HEART RATE: 68 BPM | DIASTOLIC BLOOD PRESSURE: 68 MMHG | BODY MASS INDEX: 28.98 KG/M2 | WEIGHT: 225.8 LBS | RESPIRATION RATE: 18 BRPM

## 2024-01-30 DIAGNOSIS — I25.10 CORONARY ARTERY DISEASE INVOLVING NATIVE CORONARY ARTERY OF NATIVE HEART WITHOUT ANGINA PECTORIS: ICD-10-CM

## 2024-01-30 DIAGNOSIS — Z95.810 BIVENTRICULAR ICD (IMPLANTABLE CARDIOVERTER-DEFIBRILLATOR) IN PLACE: Primary | ICD-10-CM

## 2024-01-30 DIAGNOSIS — I50.22 CHRONIC SYSTOLIC CONGESTIVE HEART FAILURE (HCC): ICD-10-CM

## 2024-01-30 DIAGNOSIS — Z01.810 PRE-OPERATIVE CARDIOVASCULAR EXAMINATION, ICD IN PLACE: ICD-10-CM

## 2024-01-30 DIAGNOSIS — I47.20 VENTRICULAR TACHYCARDIA (HCC): ICD-10-CM

## 2024-01-30 DIAGNOSIS — Z95.810 PRE-OPERATIVE CARDIOVASCULAR EXAMINATION, ICD IN PLACE: ICD-10-CM

## 2024-01-30 DIAGNOSIS — I50.22 CHRONIC SYSTOLIC HEART FAILURE (HCC): ICD-10-CM

## 2024-01-30 PROCEDURE — 1123F ACP DISCUSS/DSCN MKR DOCD: CPT | Performed by: NURSE PRACTITIONER

## 2024-01-30 PROCEDURE — 4004F PT TOBACCO SCREEN RCVD TLK: CPT | Performed by: NURSE PRACTITIONER

## 2024-01-30 PROCEDURE — 99214 OFFICE O/P EST MOD 30 MIN: CPT | Performed by: NURSE PRACTITIONER

## 2024-01-30 PROCEDURE — G8419 CALC BMI OUT NRM PARAM NOF/U: HCPCS | Performed by: NURSE PRACTITIONER

## 2024-01-30 PROCEDURE — G8484 FLU IMMUNIZE NO ADMIN: HCPCS | Performed by: NURSE PRACTITIONER

## 2024-01-30 PROCEDURE — 3017F COLORECTAL CA SCREEN DOC REV: CPT | Performed by: NURSE PRACTITIONER

## 2024-01-30 PROCEDURE — G8427 DOCREV CUR MEDS BY ELIG CLIN: HCPCS | Performed by: NURSE PRACTITIONER

## 2024-01-30 RX ORDER — CHLORHEXIDINE GLUCONATE 4 G/100ML
SOLUTION TOPICAL
Qty: 1 EACH | Refills: 0 | Status: SHIPPED | OUTPATIENT
Start: 2024-01-30

## 2024-01-30 ASSESSMENT — PATIENT HEALTH QUESTIONNAIRE - PHQ9
SUM OF ALL RESPONSES TO PHQ9 QUESTIONS 1 & 2: 0
SUM OF ALL RESPONSES TO PHQ QUESTIONS 1-9: 0
SUM OF ALL RESPONSES TO PHQ QUESTIONS 1-9: 0
2. FEELING DOWN, DEPRESSED OR HOPELESS: 0
1. LITTLE INTEREST OR PLEASURE IN DOING THINGS: 0
SUM OF ALL RESPONSES TO PHQ QUESTIONS 1-9: 0
SUM OF ALL RESPONSES TO PHQ QUESTIONS 1-9: 0

## 2024-01-30 NOTE — PATIENT INSTRUCTIONS
You are scheduled for the following procedure with Dr. Quinones:  BIV ICD generator change at [facility], [address] [place] [address]        PLEASE be aware that your procedure date/time is tentative and subject to change due to emergency cases.      Procedure date/time:    Monday, April 1, 2024 at  9:30 am - please arrive by  7:30 am    ARRIVAL time:  (You will need  to be discharged home with.)     [X]  [place] procedures: please arrive to check in on 2nd floor two hours prior to your procedure the day of your procedure.    Pre-procedure Labs:    PRE-PROCEDURE LABS NEEDED: Yes   -  please have labs done after 3/1/24 and before 3/28/24   Forms for labwork may be given at appointment. If not, they will be mailed to you.  These can be done at any [facility] or [facility] lab.        [facility]  [address], [address]  [place], [place] [address]  Monday-Friday 730A-430P (closed 1230-130P)  [phone]    [address], [address]   [place] [address]  Monday-Friday 8:00AM - 5:00 PM   [phone]    [facility]  [address], [address]  [place], [place] [address]  Monday-Friday 7A-5P  [phone]    [facility]  [address]  [place], [place] [address]  Monday-Friday 730A-430P  [phone] (closed 1-2P)    [facility]  [address] , [address]  [place], [place] [address]  Monday-Friday 730A-430P (closed 1230-130P)  [phone]    [facility]  [address] [place], [place] [address]  Monday-Friday 7A-430P  [phone]    [facility]  [address]  [place], [place] [address]  Monday-Friday 730A-430P (closed 1-2P)  [phone]            Medication Instructions:    Please do not stop or hold any medications prior to your

## 2024-01-30 NOTE — PROGRESS NOTES
Room #: 3    Chief Complaint   Patient presents with    Follow-up     Updated H&P for gen change       Vitals:    01/30/24 0909   BP: 128/68   Site: Right Upper Arm   Position: Sitting   Cuff Size: Large Adult   Pulse: 68   Resp: 18   Weight: 102.4 kg (225 lb 12.8 oz)   Height: 1.88 m (6' 2\")         Chest pain:  NO    Have you been to the ER, urgent care, or hospitalized outside of [facility] since your last visit?   NO    Refills:  NO

## 2024-01-30 NOTE — TELEPHONE ENCOUNTER
Spoke with Pt of BIV ICD Gen Change w/Dr. Quinones at [facility]. For 4/1/24 At 9:30am arrive at 7:30am Pt aware that they need a  NPO from Midnight the night before. Check in at the second floor Outpt. Reg. Desk. Pt is to have Labs done between 3/1/24-3/28/24.   Medications:  Ok to take   VO by /nurse Karly ABEBE

## 2024-02-26 RX ORDER — PAROXETINE HYDROCHLORIDE 20 MG/1
20 TABLET, FILM COATED ORAL DAILY
Qty: 30 TABLET | Refills: 3 | OUTPATIENT
Start: 2024-02-26

## 2024-02-27 ENCOUNTER — TELEPHONE (OUTPATIENT)
Age: 69
End: 2024-02-27

## 2024-02-27 NOTE — TELEPHONE ENCOUNTER
----- Message from Fabiana Rose RN sent at 2/27/2024  8:50 AM EST -----  Regarding: ARIELLA  This pt is scheduled for a gen change 4/1. His remote shows he is 98% BP. Battery looks worrisome showing 0 months remaining-should he be moved up?

## 2024-02-27 NOTE — TELEPHONE ENCOUNTER
Attempted to reach patient by telephone. A message was left for return call.   Can schedule patient for tomorrow at [facility]

## 2024-03-05 DIAGNOSIS — I25.10 CORONARY ARTERY DISEASE INVOLVING NATIVE CORONARY ARTERY OF NATIVE HEART WITHOUT ANGINA PECTORIS: ICD-10-CM

## 2024-03-05 DIAGNOSIS — Z95.810 BIVENTRICULAR ICD (IMPLANTABLE CARDIOVERTER-DEFIBRILLATOR) IN PLACE: ICD-10-CM

## 2024-03-05 DIAGNOSIS — I50.22 CHRONIC SYSTOLIC CONGESTIVE HEART FAILURE (HCC): ICD-10-CM

## 2024-03-05 LAB
ANION GAP SERPL CALC-SCNC: 2 MMOL/L (ref 5–15)
BUN SERPL-MCNC: 17 MG/DL (ref 6–20)
BUN/CREAT SERPL: 11 (ref 12–20)
CALCIUM SERPL-MCNC: 9.5 MG/DL (ref 8.5–10.1)
CHLORIDE SERPL-SCNC: 111 MMOL/L (ref 97–108)
CO2 SERPL-SCNC: 28 MMOL/L (ref 21–32)
CREAT SERPL-MCNC: 1.57 MG/DL (ref 0.7–1.3)
ERYTHROCYTE [DISTWIDTH] IN BLOOD BY AUTOMATED COUNT: 14.3 % (ref 11.5–14.5)
GLUCOSE SERPL-MCNC: 127 MG/DL (ref 65–100)
HCT VFR BLD AUTO: 48.2 % (ref 36.6–50.3)
HGB BLD-MCNC: 15.2 G/DL (ref 12.1–17)
MCH RBC QN AUTO: 30.6 PG (ref 26–34)
MCHC RBC AUTO-ENTMCNC: 31.5 G/DL (ref 30–36.5)
MCV RBC AUTO: 97.2 FL (ref 80–99)
NRBC # BLD: 0 K/UL (ref 0–0.01)
NRBC BLD-RTO: 0 PER 100 WBC
PLATELET # BLD AUTO: 117 K/UL (ref 150–400)
POTASSIUM SERPL-SCNC: 4.2 MMOL/L (ref 3.5–5.1)
RBC # BLD AUTO: 4.96 M/UL (ref 4.1–5.7)
SODIUM SERPL-SCNC: 141 MMOL/L (ref 136–145)
WBC # BLD AUTO: 7.5 K/UL (ref 4.1–11.1)

## 2024-03-05 RX ORDER — CARVEDILOL 25 MG/1
25 TABLET ORAL 2 TIMES DAILY
Qty: 180 TABLET | Refills: 3 | Status: SHIPPED | OUTPATIENT
Start: 2024-03-05

## 2024-03-05 NOTE — TELEPHONE ENCOUNTER
Per verbal order from Dr. Alina Alejandro  Last appt: 1/30/2024     Future Appointments   Date Time Provider Department Center   3/11/2024  3:00 PM Bello Durham MD [facility] AMB   4/16/2024  9:40 AM PACEMAKER, [facility] AMB   6/19/2024  9:00 AM Alina Aleajndro MD [facility] AMB   8/6/2024  9:40 AM PACEMAKER, STDAVID [facility] AMB   8/6/2024 10:00 AM Chari Underwood APRN - NP [facility] AMB   1/8/2025  9:00 AM Radha Quinones MD [facility] AMB       Requested Prescriptions     Signed Prescriptions Disp Refills    carvedilol (COREG) 25 MG tablet 180 tablet 3     Sig: TAKE 1 TABLET BY MOUTH TWO TIMES A DAY.     Authorizing Provider: ALINA ALEJANDRO     Ordering User: MORENA RUSSELL

## 2024-03-11 ENCOUNTER — OFFICE VISIT (OUTPATIENT)
Facility: CLINIC | Age: 69
End: 2024-03-11
Payer: MEDICARE

## 2024-03-11 VITALS
TEMPERATURE: 98.4 F | OXYGEN SATURATION: 96 % | DIASTOLIC BLOOD PRESSURE: 76 MMHG | SYSTOLIC BLOOD PRESSURE: 113 MMHG | RESPIRATION RATE: 20 BRPM | HEART RATE: 67 BPM | HEIGHT: 73 IN | BODY MASS INDEX: 30.48 KG/M2 | WEIGHT: 230 LBS

## 2024-03-11 DIAGNOSIS — I70.1 RENAL ARTERY STENOSIS (HCC): ICD-10-CM

## 2024-03-11 DIAGNOSIS — G89.29 CHRONIC RIGHT-SIDED LOW BACK PAIN WITHOUT SCIATICA: Primary | ICD-10-CM

## 2024-03-11 DIAGNOSIS — R73.01 ELEVATED FASTING GLUCOSE: ICD-10-CM

## 2024-03-11 DIAGNOSIS — M54.50 CHRONIC RIGHT-SIDED LOW BACK PAIN WITHOUT SCIATICA: Primary | ICD-10-CM

## 2024-03-11 DIAGNOSIS — N18.30 STAGE 3 CHRONIC KIDNEY DISEASE, UNSPECIFIED WHETHER STAGE 3A OR 3B CKD (HCC): ICD-10-CM

## 2024-03-11 PROBLEM — I26.99 PULMONARY EMBOLISM, BILATERAL (HCC): Status: RESOLVED | Noted: 2023-07-27 | Resolved: 2024-03-11

## 2024-03-11 PROCEDURE — 4004F PT TOBACCO SCREEN RCVD TLK: CPT | Performed by: FAMILY MEDICINE

## 2024-03-11 PROCEDURE — G8484 FLU IMMUNIZE NO ADMIN: HCPCS | Performed by: FAMILY MEDICINE

## 2024-03-11 PROCEDURE — G8428 CUR MEDS NOT DOCUMENT: HCPCS | Performed by: FAMILY MEDICINE

## 2024-03-11 PROCEDURE — 1123F ACP DISCUSS/DSCN MKR DOCD: CPT | Performed by: FAMILY MEDICINE

## 2024-03-11 PROCEDURE — 3017F COLORECTAL CA SCREEN DOC REV: CPT | Performed by: FAMILY MEDICINE

## 2024-03-11 PROCEDURE — 99214 OFFICE O/P EST MOD 30 MIN: CPT | Performed by: FAMILY MEDICINE

## 2024-03-11 PROCEDURE — G8417 CALC BMI ABV UP PARAM F/U: HCPCS | Performed by: FAMILY MEDICINE

## 2024-03-11 NOTE — ASSESSMENT & PLAN NOTE
Borderline controlled, continue current treatment plan and work on more strengthening of the low back. Work on weight loss. Try theracane and fresh tennis ball massage trick.

## 2024-03-11 NOTE — PROGRESS NOTES
Chief Complaint   Patient presents with    New Patient     Np to provider seen by Dr. Holt once.  Pt would like to discuss low back pain 6 months went through pt for ten weeks.  Gen health Is terrible is scheduled to get pacemaker replaced early April          
(36.9 °C)   Resp 20   Ht 1.854 m (6' 1\")   Wt 104.3 kg (230 lb)   SpO2 96%   BMI 30.34 kg/m²     Physical exam:  Constitutional: well-appearing, no acute distress  Eyes: EOM intact. PERRL bilateral. Not icteric.  Cardiac: normal rate, regular rhythm.  Pulm: normal rate, normal effort.  Skin: normal color and turgor.  Lower extrem: no edema.  Back: muscle spasming R lower paraspinal lumbar region.    Note: relevant labs reviewed and noted in the Overview section above.       An electronic signature was used to authenticate this note.     Portions of this note may have been populated using smart dictation software and may have \"sounds-like\" errors present.     Pt was counseled on risks, benefits and alternatives of treatment options. All questions were asked and answered and the patient was agreeable with the treatment plan as outlined.    Bello Durham MD  [facility]  [phone]

## 2024-03-18 NOTE — TELEPHONE ENCOUNTER
Per verbal order from Dr. Alina Hill  Last appt: 1/30/2024     Future Appointments   Date Time Provider Department Center   4/16/2024  9:40 AM PACEMAKER, STFRVICKY [facility] AMB   6/19/2024  9:00 AM [facility]   8/6/2024  9:40 AM PACEMAKER, STDAVID [facility] AMB   8/6/2024 10:00 AM [facility]   9/13/2024  1:00 PM [facility]   1/8/2025  9:00 AM Radha Quinones [facility]       Requested Prescriptions     Signed Prescriptions Disp Refills    sacubitril-valsartan (ENTRESTO) 24-26 MG per tablet 180 tablet 3     Sig: Take 1 tablet by mouth 2 times daily     Authorizing Provider: ALINA HILL     Ordering User: MORENA RUSSELL

## 2024-03-29 ENCOUNTER — PREP FOR PROCEDURE (OUTPATIENT)
Age: 69
End: 2024-03-29

## 2024-03-29 RX ORDER — SODIUM CHLORIDE 0.9 % (FLUSH) 0.9 %
5-40 SYRINGE (ML) INJECTION EVERY 12 HOURS SCHEDULED
Status: CANCELLED | OUTPATIENT
Start: 2024-03-29

## 2024-03-29 RX ORDER — SODIUM CHLORIDE 9 MG/ML
INJECTION, SOLUTION INTRAVENOUS PRN
Status: CANCELLED | OUTPATIENT
Start: 2024-03-29

## 2024-03-29 RX ORDER — SODIUM CHLORIDE 0.9 % (FLUSH) 0.9 %
5-40 SYRINGE (ML) INJECTION PRN
Status: CANCELLED | OUTPATIENT
Start: 2024-03-29

## 2024-03-29 NOTE — TELEPHONE ENCOUNTER
Spoke To Pt:  Just a reminder not to forget your BIV Gen Change scheduled for Monday 4/1/24.  Arriving at 6:30am spoke to pt to come in at 6:30am   You will need a  must stay on site  NPO from midnight   check in at the second  floor outpt. reg. Desk.  Medications:  Ok to take  VO by /nurse Darcy/Kristian

## 2024-04-01 ENCOUNTER — HOSPITAL ENCOUNTER (OUTPATIENT)
Facility: HOSPITAL | Age: 69
Setting detail: OUTPATIENT SURGERY
Discharge: HOME | End: 2024-04-01
Attending: INTERNAL MEDICINE | Admitting: INTERNAL MEDICINE
Payer: MEDICARE

## 2024-04-01 VITALS
BODY MASS INDEX: 29.93 KG/M2 | DIASTOLIC BLOOD PRESSURE: 56 MMHG | HEIGHT: 73 IN | TEMPERATURE: 97.6 F | SYSTOLIC BLOOD PRESSURE: 116 MMHG | OXYGEN SATURATION: 92 % | HEART RATE: 61 BPM | RESPIRATION RATE: 14 BRPM | WEIGHT: 225.8 LBS

## 2024-04-01 DIAGNOSIS — I50.22 CHRONIC SYSTOLIC HEART FAILURE (HCC): ICD-10-CM

## 2024-04-01 DIAGNOSIS — Z01.810 PRE-OPERATIVE CARDIOVASCULAR EXAMINATION, ICD IN PLACE: ICD-10-CM

## 2024-04-01 DIAGNOSIS — Z95.810 PRE-OPERATIVE CARDIOVASCULAR EXAMINATION, ICD IN PLACE: ICD-10-CM

## 2024-04-01 PROCEDURE — 2709999900 HC NON-CHARGEABLE SUPPLY: Performed by: INTERNAL MEDICINE

## 2024-04-01 PROCEDURE — 99152 MOD SED SAME PHYS/QHP 5/>YRS: CPT | Performed by: INTERNAL MEDICINE

## 2024-04-01 PROCEDURE — 33264 RMVL & RPLCMT DFB GEN MLT LD: CPT | Performed by: INTERNAL MEDICINE

## 2024-04-01 PROCEDURE — 2500000003 HC RX 250 WO HCPCS: Performed by: INTERNAL MEDICINE

## 2024-04-01 PROCEDURE — A4217 STERILE WATER/SALINE, 500 ML: HCPCS | Performed by: INTERNAL MEDICINE

## 2024-04-01 PROCEDURE — 2580000003 HC RX 258: Performed by: INTERNAL MEDICINE

## 2024-04-01 PROCEDURE — 99153 MOD SED SAME PHYS/QHP EA: CPT | Performed by: INTERNAL MEDICINE

## 2024-04-01 PROCEDURE — C1882 AICD, OTHER THAN SING/DUAL: HCPCS | Performed by: INTERNAL MEDICINE

## 2024-04-01 PROCEDURE — C1889 IMPLANT/INSERT DEVICE, NOC: HCPCS | Performed by: INTERNAL MEDICINE

## 2024-04-01 PROCEDURE — 6360000002 HC RX W HCPCS: Performed by: INTERNAL MEDICINE

## 2024-04-01 DEVICE — CARDIAC RESYNCHRONIZATION DEVICE, TIERED-THERAPY CARDIOVERTER/DEFIBRILLATOR VVED DDDRV
Type: IMPLANTABLE DEVICE | Status: FUNCTIONAL
Brand: QUADRA ASSURA MP™

## 2024-04-01 DEVICE — ENVELOPE CMRM6133 ABSORB LRG MR
Type: IMPLANTABLE DEVICE | Status: FUNCTIONAL
Brand: TYRX™

## 2024-04-01 RX ORDER — SODIUM CHLORIDE 9 MG/ML
INJECTION, SOLUTION INTRAVENOUS CONTINUOUS PRN
Status: COMPLETED | OUTPATIENT
Start: 2024-04-01 | End: 2024-04-01

## 2024-04-01 RX ORDER — SODIUM CHLORIDE 9 MG/ML
INJECTION, SOLUTION INTRAVENOUS PRN
Status: DISCONTINUED | OUTPATIENT
Start: 2024-04-01 | End: 2024-04-01 | Stop reason: HOSPADM

## 2024-04-01 RX ORDER — FENTANYL CITRATE 50 UG/ML
INJECTION, SOLUTION INTRAMUSCULAR; INTRAVENOUS PRN
Status: DISCONTINUED | OUTPATIENT
Start: 2024-04-01 | End: 2024-04-01 | Stop reason: HOSPADM

## 2024-04-01 RX ORDER — SODIUM CHLORIDE 0.9 % (FLUSH) 0.9 %
5-40 SYRINGE (ML) INJECTION EVERY 12 HOURS SCHEDULED
Status: DISCONTINUED | OUTPATIENT
Start: 2024-04-01 | End: 2024-04-01 | Stop reason: HOSPADM

## 2024-04-01 RX ORDER — ONDANSETRON 2 MG/ML
4 INJECTION INTRAMUSCULAR; INTRAVENOUS EVERY 6 HOURS PRN
Status: DISCONTINUED | OUTPATIENT
Start: 2024-04-01 | End: 2024-04-01 | Stop reason: HOSPADM

## 2024-04-01 RX ORDER — LIDOCAINE HYDROCHLORIDE AND EPINEPHRINE BITARTRATE 20; .01 MG/ML; MG/ML
INJECTION, SOLUTION SUBCUTANEOUS PRN
Status: DISCONTINUED | OUTPATIENT
Start: 2024-04-01 | End: 2024-04-01 | Stop reason: HOSPADM

## 2024-04-01 RX ORDER — ACETAMINOPHEN 325 MG/1
650 TABLET ORAL EVERY 4 HOURS PRN
Status: DISCONTINUED | OUTPATIENT
Start: 2024-04-01 | End: 2024-04-01 | Stop reason: HOSPADM

## 2024-04-01 RX ORDER — CEFAZOLIN SODIUM 1 G/3ML
INJECTION, POWDER, FOR SOLUTION INTRAMUSCULAR; INTRAVENOUS PRN
Status: DISCONTINUED | OUTPATIENT
Start: 2024-04-01 | End: 2024-04-01 | Stop reason: HOSPADM

## 2024-04-01 RX ORDER — BUPIVACAINE HYDROCHLORIDE 2.5 MG/ML
INJECTION, SOLUTION EPIDURAL; INFILTRATION; INTRACAUDAL PRN
Status: DISCONTINUED | OUTPATIENT
Start: 2024-04-01 | End: 2024-04-01 | Stop reason: HOSPADM

## 2024-04-01 RX ORDER — SODIUM CHLORIDE 0.9 % (FLUSH) 0.9 %
5-40 SYRINGE (ML) INJECTION PRN
Status: DISCONTINUED | OUTPATIENT
Start: 2024-04-01 | End: 2024-04-01 | Stop reason: HOSPADM

## 2024-04-01 NOTE — H&P
(HIBICLENS) 4 % external liquid Apply to the upper chest area from shoulder/neck to mid line of chest and to below the nipple every day, 3 days prior to the procedure. 1 each 0    PARoxetine (PAXIL) 20 MG tablet Take 1 tablet by mouth daily 30 tablet 3    aspirin 81 MG chewable tablet       rosuvastatin (CRESTOR) 40 MG tablet Take 1 tablet by mouth nightly 90 tablet 3    carvedilol (COREG) 25 MG tablet Take 1 tablet by mouth 2 times daily      sacubitril-valsartan (ENTRESTO) 24-26 MG per tablet Take 1 tablet by mouth 2 times daily       No current facility-administered medications for this visit.     No Known Allergies  Past Medical History:   Diagnosis Date    CAD (coronary artery disease)     Carotid artery disease ([facility])     CHF (congestive heart failure), NYHA class I, chronic, systolic ([facility])     CKD (chronic kidney disease) stage 3, GFR 30-59 ml/min ([facility])     Hx of deep venous thrombosis     travel provoked    Hx pulmonary embolism     travel provoked    ICD (implantable cardioverter-defibrillator) in place     Panic attacks 08/03/2023    Tobacco abuse      Past Surgical History:   Procedure Laterality Date    ABLATION OF DYSRHYTHMIC FOCUS  2021    CARDIAC CATHETERIZATION      CORONARY ANGIOPLASTY WITH STENT PLACEMENT      CORONARY ARTERY BYPASS GRAFT      PACEMAKER       Family History   Problem Relation Age of Onset    Coronary Art Dis Mother     Coronary Art Dis Father     Stroke Brother     Coronary Art Dis Brother     Coronary Art Dis Brother      Social History     Tobacco Use    Smoking status: Every Day     Current packs/day: 0.50     Types: Cigarettes    Smokeless tobacco: Never   Substance Use Topics    Alcohol use: Yes     Comment: occassionally        Review of Systems:   12 point review of systems was performed. All negative except for HPI     Objective:   /68 (Site: Right Upper Arm, Position: Sitting, Cuff Size: Large Adult)   Pulse 68   Resp 18   Ht 1.88 m (6' 2\")   Wt 102.4 kg (225 lb

## 2024-04-01 NOTE — PROGRESS NOTES
1015  Patient ambulates in hallway or on unit.    Left chest site is clean dry and intact.    1030  Pt discharged via wheelchair with family. Personal belongings with patient upon discharge.  Ice pack applied.  Discharge instructions reviewed with family and patient.

## 2024-04-01 NOTE — DISCHARGE INSTRUCTIONS
Pacemaker Generator Change   Discharge Instructions    Please make sure you have received your Temporary Pacemaker identification card with your discharge instructions      MEDICATIONS        Take only the medications prescribed to you at discharge.      ACTIVITY        Return to your normal activity, except as noted below.    Avoid tight clothes or unnecessary pressure over your incision (such as bra straps or seat belts).  If it is tender or sensitive to clothing, cover the incision with a soft dressing or pad.    Avoid driving for at least 72 hours.       SHOWERING        Leave the bandage over your incision until your clinic follow up in 10-14 days after the Pacemaker implant.  You bandage will be removed in clinic during that appointment.     It is important to keep the bandaged area clean and dry.  You may shower around the site until the bandage is removed in clinic. Thereafter, you may shower after the bandage is removed, washing it gently with soap and water. Do not apply any lotions, powders, or perfumes to the incision line.    Avoid submerging your incision in water (tub baths, hot tubs, or swimming) for four weeks.     Underneath the dressing.    If you have white steri-strips over your incision (underneath the gauze dressing), they will curl up at the end and fall off, usually within 10 days.  Do not pull them off.  - OR -   You may have a different type of closure for the incision including a dermabond adhesive which will slowly peel and come off on its own once you are able to shower.       DISCHARGE PRECAUTIONS        You can have an MRI after 6 weeks.  You must be aware that any strong magnet or magnetic field can affect your Pacemaker.  In general, be careful of metal detectors, heavy machinery, and any area where arc-welding is performed.  When approaching a security checkpoint show your Pacemaker ID Card to security personnel.    Always tell your doctor or dentist that you have a Pacemaker.  In

## 2024-04-01 NOTE — PROGRESS NOTES
8:56 AM    TRANSFER - IN REPORT:    Verbal report received from Dennis BENNETT on Toni Montiel  being received from EP Lab for routine post-op      Report consisted of patient's Situation, Background, Assessment and   Recommendations(SBAR).     Information from the following report(s) Nurse Handoff Report was reviewed with the receiving nurse.    Opportunity for questions and clarification was provided.      Assessment completed upon patient's arrival to unit and care assumed.

## 2024-04-01 NOTE — PROCEDURES
Generator Replacement    Procedure Date: 04/01/24  Lab Physician: Radha Quinones MD      INDICATIONS:  69 yo gentleman with a history of chronic CHF (LVEF of 30-35%), history of CAD with VT, s/p prior CRT-D ([facility]) now with device at [facility] referred for generator replacement.    PROCEDURE NARRATIVE  After obtaining informed consent the patient was brought to the cardiac electrophysiology Lab in a fasting non-sedated state. Continuous electrocardiographic, blood pressure, and O2 saturation monitoring was performed. Self-adhesive cardioversion patches were positioned on the chest. The skin over the left chest was prepped and draped in the usual sterile fashion Conscious sedation was administered by nursing staff independent of those performing the procedure under my supervision with intermittent dosing of anxiolytics and narcotics for a total sedation time of 35 minutes.    POCKET ACCESS  The implant area was infiltrated with local anesthesia using a mixture of Lidocaine (1%) and epinephrine. The prior incision was opened with a #10 scalpel. Electrocautery was used to gain access to the pacemaker pocket capsule. The capsule was opened and leads freed up as necessary to facilitate pulse generator replacement. Hemostasis was maintained with electrocautery.    LEAD TESTING  The leads were inspected grossly and no visual defects were observed. The leads were disconnected from the pulse generator. The pulse generator was set aside. Each pacing lead was tested. Adequate sensing and pacing parameters were noted on each lead. Backup pacing was provided by the RV pacing lead via the analyzer as needed in case of pacemaker dependence.  Ventricular sensing parameters were not tested due to the absence of an underlying ventricular rhythm.    PULSE GENERATOR INSERTION  The pocket was irrigated with saline containing antibiotic solution. The pacemaker pulse generator was connected to the leads with close attention paid to

## 2024-04-02 LAB — ECHO BSA: 2.3 M2

## 2024-04-16 ENCOUNTER — PROCEDURE VISIT (OUTPATIENT)
Age: 69
End: 2024-04-16
Payer: MEDICARE

## 2024-04-16 DIAGNOSIS — Z95.810 BIVENTRICULAR ICD (IMPLANTABLE CARDIOVERTER-DEFIBRILLATOR) IN PLACE: Primary | ICD-10-CM

## 2024-04-16 PROCEDURE — 93284 PRGRMG EVAL IMPLANTABLE DFB: CPT | Performed by: INTERNAL MEDICINE

## 2024-04-16 NOTE — PROGRESS NOTES
Patient presents for wound check post-device implantation. The dressing was removed and the site was inspected. The site appeared to be well-healing without ecchymosis/tenderness/erythema. Denies pain, fevers, discharge.       Future Appointments   Date Time Provider Department Center   6/19/2024  9:00 AM Alina Alejandro MD [facility] AMB   8/6/2024  9:40 AM PACEMAKER, STFRANCES HERMES BS AMB   8/6/2024 10:00 AM Chari Underwood APRN - NP [facility] AMB   9/13/2024  1:00 PM Bello Durham MD [facility] BS AMB   1/8/2025  9:00 AM Radha Quinones MD [facility] AMB         Continue follow up in device clinic as planned.

## 2024-05-28 RX ORDER — ROSUVASTATIN CALCIUM 40 MG/1
40 TABLET, COATED ORAL NIGHTLY
Qty: 90 TABLET | Refills: 0 | Status: SHIPPED | OUTPATIENT
Start: 2024-05-28

## 2024-05-28 NOTE — TELEPHONE ENCOUNTER
Per verbal order from Dr. Alina Hill  Last appt: Visit date not found     Future Appointments   Date Time Provider Department Center   7/17/2024  8:40 AM [facility]   8/6/2024  9:40 AM PACEMAKER, [facility] BS AMB   8/6/2024 10:00 AM Mackenzie Marr [facility]N - C[facility]   9/13/2024  1:00 PM [facility]   1/8/2025  9:00 AM [facility]ones [facility]       Requested Prescriptions     Signed Prescriptions Disp Refills    rosuvastatin (CRESTOR) 40 MG tablet 90 tablet 0     Sig: TAKE 1 TABLET BY MOUTH NIGHTLY     Authorizing Provider: ALINA HILL     Ordering User: MORENA RUSSELL

## 2024-06-19 PROCEDURE — 93297 REM INTERROG DEV EVAL ICPMS: CPT | Performed by: INTERNAL MEDICINE

## 2024-06-20 DIAGNOSIS — F41.1 GENERALIZED ANXIETY DISORDER WITH PANIC ATTACKS: Primary | ICD-10-CM

## 2024-06-20 DIAGNOSIS — F41.0 GENERALIZED ANXIETY DISORDER WITH PANIC ATTACKS: Primary | ICD-10-CM

## 2024-06-20 RX ORDER — PAROXETINE HYDROCHLORIDE 20 MG/1
20 TABLET, FILM COATED ORAL DAILY
Qty: 90 TABLET | Refills: 3 | Status: SHIPPED | OUTPATIENT
Start: 2024-06-20

## 2024-07-21 PROCEDURE — 93297 REM INTERROG DEV EVAL ICPMS: CPT | Performed by: INTERNAL MEDICINE

## 2024-08-06 ENCOUNTER — OFFICE VISIT (OUTPATIENT)
Age: 69
End: 2024-08-06
Payer: MEDICARE

## 2024-08-06 ENCOUNTER — PROCEDURE VISIT (OUTPATIENT)
Age: 69
End: 2024-08-06

## 2024-08-06 VITALS
OXYGEN SATURATION: 96 % | HEART RATE: 70 BPM | BODY MASS INDEX: 29.29 KG/M2 | WEIGHT: 221 LBS | HEIGHT: 73 IN | DIASTOLIC BLOOD PRESSURE: 76 MMHG | SYSTOLIC BLOOD PRESSURE: 110 MMHG | RESPIRATION RATE: 16 BRPM

## 2024-08-06 DIAGNOSIS — Z95.810 BIVENTRICULAR ICD (IMPLANTABLE CARDIOVERTER-DEFIBRILLATOR) IN PLACE: Primary | ICD-10-CM

## 2024-08-06 DIAGNOSIS — I50.22 CHRONIC SYSTOLIC HEART FAILURE (HCC): ICD-10-CM

## 2024-08-06 DIAGNOSIS — I47.20 VENTRICULAR TACHYCARDIA (HCC): Primary | ICD-10-CM

## 2024-08-06 DIAGNOSIS — I25.5 ISCHEMIC CARDIOMYOPATHY: ICD-10-CM

## 2024-08-06 DIAGNOSIS — Z95.810 BIVENTRICULAR ICD (IMPLANTABLE CARDIOVERTER-DEFIBRILLATOR) IN PLACE: ICD-10-CM

## 2024-08-06 DIAGNOSIS — I25.10 CORONARY ARTERY DISEASE INVOLVING NATIVE CORONARY ARTERY OF NATIVE HEART WITHOUT ANGINA PECTORIS: ICD-10-CM

## 2024-08-06 PROCEDURE — G8417 CALC BMI ABV UP PARAM F/U: HCPCS

## 2024-08-06 PROCEDURE — 99214 OFFICE O/P EST MOD 30 MIN: CPT

## 2024-08-06 PROCEDURE — 3017F COLORECTAL CA SCREEN DOC REV: CPT

## 2024-08-06 PROCEDURE — 4004F PT TOBACCO SCREEN RCVD TLK: CPT

## 2024-08-06 PROCEDURE — 1123F ACP DISCUSS/DSCN MKR DOCD: CPT

## 2024-08-06 PROCEDURE — G8427 DOCREV CUR MEDS BY ELIG CLIN: HCPCS

## 2024-08-06 RX ORDER — CEPHALEXIN 500 MG/1
500 CAPSULE ORAL 3 TIMES DAILY
Qty: 21 CAPSULE | Refills: 0 | Status: SHIPPED | OUTPATIENT
Start: 2024-08-06 | End: 2024-08-13

## 2024-08-06 ASSESSMENT — PATIENT HEALTH QUESTIONNAIRE - PHQ9
SUM OF ALL RESPONSES TO PHQ QUESTIONS 1-9: 0
SUM OF ALL RESPONSES TO PHQ9 QUESTIONS 1 & 2: 0
1. LITTLE INTEREST OR PLEASURE IN DOING THINGS: NOT AT ALL
SUM OF ALL RESPONSES TO PHQ QUESTIONS 1-9: 0
2. FEELING DOWN, DEPRESSED OR HOPELESS: NOT AT ALL

## 2024-08-06 NOTE — PROGRESS NOTES
Room #: 5    C/o scab that will not heal post gen change.        Chief Complaint   Patient presents with    Follow-up     4 mo post gen change    Device Check       Vitals:    08/06/24 0928   BP: 110/76   Site: Left Upper Arm   Position: Sitting   Cuff Size: Medium Adult   Pulse: 70   Resp: 16   SpO2: 96%   Weight: 100.2 kg (221 lb)   Height: 1.854 m (6' 1\")         Chest pain:  NO    Have you been to the ER, urgent care, or hospitalized outside of [facility] since your last visit?   NO    Refills:  NO  
affect      Medications:     Current Outpatient Medications   Medication Sig    cephALEXin (KEFLEX) 500 MG capsule Take 1 capsule by mouth 3 times daily for 7 days    PARoxetine (PAXIL) 20 MG tablet TAKE 1 TABLET BY MOUTH EVERY DAY    rosuvastatin (CRESTOR) 40 MG tablet TAKE 1 TABLET BY MOUTH NIGHTLY    sacubitril-valsartan (ENTRESTO) 24-26 MG per tablet Take 1 tablet by mouth 2 times daily    carvedilol (COREG) 25 MG tablet TAKE 1 TABLET BY MOUTH TWO TIMES A DAY.    aspirin 81 MG chewable tablet Take 1 tablet by mouth daily     No current facility-administered medications for this visit.      Diagnostic Data Review:     No valid procedures specified.  No valid procedures specified.  No valid procedures specified.  [unfilled]      Lab Review:     Lab Results   Component Value Date/Time    CHOL 131 02/21/2023 11:44 AM    HDL 31 02/21/2023 11:44 AM    LDL 58.2 02/21/2023 11:44 AM     No results found for: \"MADHAVI\", \"CREAPOC\"  Lab Results   Component Value Date/Time    BUN 17 03/05/2024 01:02 PM     Lab Results   Component Value Date/Time    K 4.2 03/05/2024 01:02 PM     No results found for: \"HBA1C\"  Lab Results   Component Value Date/Time    HGB 15.2 03/05/2024 01:02 PM     Lab Results   Component Value Date/Time     03/05/2024 01:02 PM                  ___________________________________________________    Mackenzie Marr NP    Cardiac Electrophysiology  [facility]   [address] [facility] [address]  [place] [address] [phone]

## 2024-08-21 ENCOUNTER — OFFICE VISIT (OUTPATIENT)
Age: 69
End: 2024-08-21

## 2024-08-21 DIAGNOSIS — Z95.810 BIVENTRICULAR ICD (IMPLANTABLE CARDIOVERTER-DEFIBRILLATOR) IN PLACE: Primary | ICD-10-CM

## 2024-08-21 DIAGNOSIS — Z95.810 PRESENCE OF AUTOMATIC (IMPLANTABLE) CARDIAC DEFIBRILLATOR: ICD-10-CM

## 2024-08-28 RX ORDER — ROSUVASTATIN CALCIUM 40 MG/1
40 TABLET, COATED ORAL NIGHTLY
Qty: 90 TABLET | Refills: 0 | Status: SHIPPED | OUTPATIENT
Start: 2024-08-28

## 2024-09-06 DIAGNOSIS — I70.1 RENAL ARTERY STENOSIS (HCC): ICD-10-CM

## 2024-09-06 DIAGNOSIS — N18.30 STAGE 3 CHRONIC KIDNEY DISEASE, UNSPECIFIED WHETHER STAGE 3A OR 3B CKD (HCC): ICD-10-CM

## 2024-09-06 DIAGNOSIS — R73.01 ELEVATED FASTING GLUCOSE: ICD-10-CM

## 2024-09-06 LAB
ALBUMIN SERPL-MCNC: 3.3 G/DL (ref 3.5–5)
ALBUMIN/GLOB SERPL: 0.8 (ref 1.1–2.2)
ALP SERPL-CCNC: 80 U/L (ref 45–117)
ALT SERPL-CCNC: 18 U/L (ref 12–78)
ANION GAP SERPL CALC-SCNC: 1 MMOL/L (ref 2–12)
AST SERPL-CCNC: 10 U/L (ref 15–37)
BILIRUB SERPL-MCNC: 0.4 MG/DL (ref 0.2–1)
BUN SERPL-MCNC: 18 MG/DL (ref 6–20)
BUN/CREAT SERPL: 12 (ref 12–20)
CALCIUM SERPL-MCNC: 9 MG/DL (ref 8.5–10.1)
CHLORIDE SERPL-SCNC: 110 MMOL/L (ref 97–108)
CHOLEST SERPL-MCNC: 121 MG/DL
CO2 SERPL-SCNC: 32 MMOL/L (ref 21–32)
CREAT SERPL-MCNC: 1.48 MG/DL (ref 0.7–1.3)
ERYTHROCYTE [DISTWIDTH] IN BLOOD BY AUTOMATED COUNT: 14.6 % (ref 11.5–14.5)
EST. AVERAGE GLUCOSE BLD GHB EST-MCNC: 146 MG/DL
GLOBULIN SER CALC-MCNC: 3.9 G/DL (ref 2–4)
GLUCOSE SERPL-MCNC: 140 MG/DL (ref 65–100)
HBA1C MFR BLD: 6.7 % (ref 4–5.6)
HCT VFR BLD AUTO: 48.7 % (ref 36.6–50.3)
HDLC SERPL-MCNC: 29 MG/DL
HDLC SERPL: 4.2 (ref 0–5)
HGB BLD-MCNC: 15.3 G/DL (ref 12.1–17)
LDLC SERPL CALC-MCNC: 58 MG/DL (ref 0–100)
MCH RBC QN AUTO: 31 PG (ref 26–34)
MCHC RBC AUTO-ENTMCNC: 31.4 G/DL (ref 30–36.5)
MCV RBC AUTO: 98.6 FL (ref 80–99)
NRBC # BLD: 0 K/UL (ref 0–0.01)
NRBC BLD-RTO: 0 PER 100 WBC
PLATELET # BLD AUTO: 156 K/UL (ref 150–400)
POTASSIUM SERPL-SCNC: 4.1 MMOL/L (ref 3.5–5.1)
PROT SERPL-MCNC: 7.2 G/DL (ref 6.4–8.2)
RBC # BLD AUTO: 4.94 M/UL (ref 4.1–5.7)
SODIUM SERPL-SCNC: 143 MMOL/L (ref 136–145)
TRIGL SERPL-MCNC: 170 MG/DL
VLDLC SERPL CALC-MCNC: 34 MG/DL
WBC # BLD AUTO: 8.3 K/UL (ref 4.1–11.1)

## 2024-09-10 SDOH — HEALTH STABILITY: PHYSICAL HEALTH: ON AVERAGE, HOW MANY DAYS PER WEEK DO YOU ENGAGE IN MODERATE TO STRENUOUS EXERCISE (LIKE A BRISK WALK)?: 7 DAYS

## 2024-09-10 SDOH — HEALTH STABILITY: PHYSICAL HEALTH: ON AVERAGE, HOW MANY MINUTES DO YOU ENGAGE IN EXERCISE AT THIS LEVEL?: 120 MIN

## 2024-09-10 ASSESSMENT — PATIENT HEALTH QUESTIONNAIRE - PHQ9
SUM OF ALL RESPONSES TO PHQ9 QUESTIONS 1 & 2: 0
SUM OF ALL RESPONSES TO PHQ QUESTIONS 1-9: 0
1. LITTLE INTEREST OR PLEASURE IN DOING THINGS: NOT AT ALL
SUM OF ALL RESPONSES TO PHQ QUESTIONS 1-9: 0
SUM OF ALL RESPONSES TO PHQ QUESTIONS 1-9: 0
2. FEELING DOWN, DEPRESSED OR HOPELESS: NOT AT ALL
SUM OF ALL RESPONSES TO PHQ QUESTIONS 1-9: 0

## 2024-09-10 ASSESSMENT — LIFESTYLE VARIABLES
HOW OFTEN DO YOU HAVE A DRINK CONTAINING ALCOHOL: MONTHLY OR LESS
HOW OFTEN DO YOU HAVE A DRINK CONTAINING ALCOHOL: 2
HOW OFTEN DO YOU HAVE SIX OR MORE DRINKS ON ONE OCCASION: 1
HOW MANY STANDARD DRINKS CONTAINING ALCOHOL DO YOU HAVE ON A TYPICAL DAY: 1 OR 2
HOW MANY STANDARD DRINKS CONTAINING ALCOHOL DO YOU HAVE ON A TYPICAL DAY: 1

## 2024-09-13 ENCOUNTER — OFFICE VISIT (OUTPATIENT)
Facility: CLINIC | Age: 69
End: 2024-09-13

## 2024-09-13 ENCOUNTER — PATIENT MESSAGE (OUTPATIENT)
Age: 69
End: 2024-09-13

## 2024-09-13 VITALS
TEMPERATURE: 97.9 F | WEIGHT: 223 LBS | RESPIRATION RATE: 16 BRPM | HEIGHT: 73 IN | BODY MASS INDEX: 29.55 KG/M2 | HEART RATE: 55 BPM | OXYGEN SATURATION: 97 % | DIASTOLIC BLOOD PRESSURE: 78 MMHG | SYSTOLIC BLOOD PRESSURE: 114 MMHG

## 2024-09-13 DIAGNOSIS — Z13.6 SCREENING FOR AAA (ABDOMINAL AORTIC ANEURYSM): ICD-10-CM

## 2024-09-13 DIAGNOSIS — Z72.0 TOBACCO ABUSE: ICD-10-CM

## 2024-09-13 DIAGNOSIS — F17.210 NICOTINE DEPENDENCE, CIGARETTES, UNCOMPLICATED: ICD-10-CM

## 2024-09-13 DIAGNOSIS — Z12.5 PROSTATE CANCER SCREENING: ICD-10-CM

## 2024-09-13 DIAGNOSIS — E78.1 HYPERTRIGLYCERIDEMIA: ICD-10-CM

## 2024-09-13 DIAGNOSIS — R73.01 ELEVATED FASTING GLUCOSE: ICD-10-CM

## 2024-09-13 DIAGNOSIS — Z87.891 PERSONAL HISTORY OF TOBACCO USE, PRESENTING HAZARDS TO HEALTH: ICD-10-CM

## 2024-09-13 DIAGNOSIS — Z12.11 COLON CANCER SCREENING: ICD-10-CM

## 2024-09-13 DIAGNOSIS — N18.30 STAGE 3 CHRONIC KIDNEY DISEASE, UNSPECIFIED WHETHER STAGE 3A OR 3B CKD (HCC): ICD-10-CM

## 2024-09-13 DIAGNOSIS — Z00.00 MEDICARE ANNUAL WELLNESS VISIT, SUBSEQUENT: Primary | ICD-10-CM

## 2024-09-13 DIAGNOSIS — Z11.59 NEED FOR HEPATITIS C SCREENING TEST: ICD-10-CM

## 2024-09-13 DIAGNOSIS — R73.03 PREDIABETES: ICD-10-CM

## 2024-09-13 RX ORDER — VARENICLINE TARTRATE 0.5 (11)-1
KIT ORAL
Qty: 53 EACH | Refills: 0 | Status: SHIPPED | OUTPATIENT
Start: 2024-09-13 | End: 2024-12-19

## 2024-09-19 ENCOUNTER — PATIENT MESSAGE (OUTPATIENT)
Age: 69
End: 2024-09-19

## 2024-09-22 ENCOUNTER — PATIENT MESSAGE (OUTPATIENT)
Age: 69
End: 2024-09-22

## 2024-10-10 ENCOUNTER — HOSPITAL ENCOUNTER (EMERGENCY)
Facility: HOSPITAL | Age: 69
Discharge: HOME | End: 2024-10-10
Attending: STUDENT IN AN ORGANIZED HEALTH CARE EDUCATION/TRAINING PROGRAM
Payer: MEDICARE

## 2024-10-10 VITALS
HEART RATE: 61 BPM | HEIGHT: 73 IN | SYSTOLIC BLOOD PRESSURE: 128 MMHG | TEMPERATURE: 98.3 F | RESPIRATION RATE: 16 BRPM | DIASTOLIC BLOOD PRESSURE: 85 MMHG | BODY MASS INDEX: 29.16 KG/M2 | WEIGHT: 220 LBS | OXYGEN SATURATION: 99 %

## 2024-10-10 DIAGNOSIS — H73.892 RETRACTED EAR DRUM, LEFT: ICD-10-CM

## 2024-10-10 DIAGNOSIS — H81.392 VERTIGO, PERIPHERAL, LEFT: Primary | ICD-10-CM

## 2024-10-10 LAB
ALBUMIN SERPL-MCNC: 3.7 G/DL (ref 3.5–5)
ALBUMIN/GLOB SERPL: 0.8 (ref 1.1–2.2)
ALP SERPL-CCNC: 72 U/L (ref 45–117)
ALT SERPL-CCNC: 21 U/L (ref 12–78)
ANION GAP SERPL CALC-SCNC: 0 MMOL/L (ref 2–12)
AST SERPL-CCNC: 14 U/L (ref 15–37)
BASOPHILS # BLD: 0.1 K/UL (ref 0–0.1)
BASOPHILS NFR BLD: 1 % (ref 0–1)
BILIRUB SERPL-MCNC: 0.5 MG/DL (ref 0.2–1)
BUN SERPL-MCNC: 24 MG/DL (ref 6–20)
BUN/CREAT SERPL: 16 (ref 12–20)
CALCIUM SERPL-MCNC: 9.8 MG/DL (ref 8.5–10.1)
CHLORIDE SERPL-SCNC: 109 MMOL/L (ref 97–108)
CO2 SERPL-SCNC: 29 MMOL/L (ref 21–32)
COMMENT:: NORMAL
CREAT SERPL-MCNC: 1.48 MG/DL (ref 0.7–1.3)
DIFFERENTIAL METHOD BLD: NORMAL
EOSINOPHIL # BLD: 0.4 K/UL (ref 0–0.4)
EOSINOPHIL NFR BLD: 6 % (ref 0–7)
ERYTHROCYTE [DISTWIDTH] IN BLOOD BY AUTOMATED COUNT: 14 % (ref 11.5–14.5)
GLOBULIN SER CALC-MCNC: 4.6 G/DL (ref 2–4)
GLUCOSE SERPL-MCNC: 164 MG/DL (ref 65–100)
HCT VFR BLD AUTO: 49.7 % (ref 36.6–50.3)
HGB BLD-MCNC: 16.4 G/DL (ref 12.1–17)
IMM GRANULOCYTES # BLD AUTO: 0 K/UL (ref 0–0.04)
IMM GRANULOCYTES NFR BLD AUTO: 0 % (ref 0–0.5)
LYMPHOCYTES # BLD: 1.6 K/UL (ref 0.8–3.5)
LYMPHOCYTES NFR BLD: 24 % (ref 12–49)
MCH RBC QN AUTO: 31.5 PG (ref 26–34)
MCHC RBC AUTO-ENTMCNC: 33 G/DL (ref 30–36.5)
MCV RBC AUTO: 95.6 FL (ref 80–99)
MONOCYTES # BLD: 0.4 K/UL (ref 0–1)
MONOCYTES NFR BLD: 5 % (ref 5–13)
NEUTS SEG # BLD: 4.3 K/UL (ref 1.8–8)
NEUTS SEG NFR BLD: 64 % (ref 32–75)
NRBC # BLD: 0 K/UL (ref 0–0.01)
NRBC BLD-RTO: 0 PER 100 WBC
PLATELET # BLD AUTO: 215 K/UL (ref 150–400)
PMV BLD AUTO: 11.1 FL (ref 8.9–12.9)
POTASSIUM SERPL-SCNC: 4.4 MMOL/L (ref 3.5–5.1)
PROT SERPL-MCNC: 8.3 G/DL (ref 6.4–8.2)
RBC # BLD AUTO: 5.2 M/UL (ref 4.1–5.7)
SODIUM SERPL-SCNC: 138 MMOL/L (ref 136–145)
SPECIMEN HOLD: NORMAL
TROPONIN I SERPL HS-MCNC: 20 NG/L (ref 0–76)
WBC # BLD AUTO: 6.8 K/UL (ref 4.1–11.1)

## 2024-10-10 PROCEDURE — 99284 EMERGENCY DEPT VISIT MOD MDM: CPT

## 2024-10-10 PROCEDURE — 94761 N-INVAS EAR/PLS OXIMETRY MLT: CPT

## 2024-10-10 PROCEDURE — 6370000000 HC RX 637 (ALT 250 FOR IP): Performed by: STUDENT IN AN ORGANIZED HEALTH CARE EDUCATION/TRAINING PROGRAM

## 2024-10-10 PROCEDURE — 84484 ASSAY OF TROPONIN QUANT: CPT

## 2024-10-10 PROCEDURE — 85025 COMPLETE CBC W/AUTO DIFF WBC: CPT

## 2024-10-10 PROCEDURE — 80053 COMPREHEN METABOLIC PANEL: CPT

## 2024-10-10 PROCEDURE — 36415 COLL VENOUS BLD VENIPUNCTURE: CPT

## 2024-10-10 RX ORDER — MECLIZINE HYDROCHLORIDE 25 MG/1
25 TABLET ORAL ONCE
Status: COMPLETED | OUTPATIENT
Start: 2024-10-10 | End: 2024-10-10

## 2024-10-10 RX ORDER — MECLIZINE HYDROCHLORIDE 25 MG/1
25 TABLET ORAL 3 TIMES DAILY PRN
Qty: 15 TABLET | Refills: 0 | Status: SHIPPED | OUTPATIENT
Start: 2024-10-10 | End: 2024-10-20

## 2024-10-10 RX ORDER — MECLIZINE HYDROCHLORIDE 25 MG/1
25 TABLET ORAL ONCE
Status: DISCONTINUED | OUTPATIENT
Start: 2024-10-10 | End: 2024-10-10

## 2024-10-10 RX ADMIN — MECLIZINE HYDROCHLORIDE 25 MG: 25 TABLET ORAL at 13:11

## 2024-10-10 NOTE — DISCHARGE INSTRUCTIONS
You have been evaluated in the Emergency Department today for dizziness. Your evaluation suggests that your symptoms are most likely due to peripheral vertigo.    You have been prescribed meclizine to help relieve your symptoms. Please take your prescription as directed. You can also try Epley Maneuvers at home to help relieve your symptoms- instructions are available online as well as in this packet.    Please follow up with your primary care doctor in 2-3 days and ENT as needed.    Return to the ER immediately for worsening or uncontrolled symptoms, worsening headache, chest pain, shortness of breath, persistent vomiting, vision changes, fainting, or for any other concerning symptoms.    Thank you for choosing us for your care.

## 2024-10-10 NOTE — ED TRIAGE NOTES
12:35 PM  I have evaluated the patient as the Provider in Rapid Medical Evaluation (RME). I have reviewed his vital signs and the triage nurse assessment. I have talked with the patient and any available family and advised that I am the provider in triage and have ordered the appropriate study to initiate their work up based on the clinical presentation during my assessment. I have advised that the patient will be accommodated in the Main ED as soon as possible. I have also requested to contact the triage nurse or myself immediately if the patient experiences any changes in their condition during this brief waiting period.    Lightheadedness and weakness that started this morning with anxious feelings. 25 year history of heart disease. 2000 triple bypass at [facility]. Multiple stents last 2008 with AICD. No recent firing. On Entresto, baby asa and \"three or four heart meds.\" Sees Dr. Dr. Alejandro. No current chest pain, or SOB, no radiating pain.  Cady Hardwick, RONALD - NP

## 2024-10-10 NOTE — ED TRIAGE NOTES
Pt arrives to the ER for complaints of dizziness, generalized weakness and feeling as though he is going to pass out. Reports that symptoms started this morning.     PMH of heart disease, triple bypass, cardiac stents. Pt also has a cardiac defibrillator.    Denies any chest pain, chest pressure, shortness of breath. Denies any arm pain, jaw pain.

## 2024-10-10 NOTE — ED NOTES
ED SIGN OUT NOTE  Care assumed at [facility] 1:40 PM EDT    Patient was signed out to me by Dr. Chau.     Patient is awaiting labs.    /85   Pulse 61   Temp 98.3 °F (36.8 °C) (Oral)   Resp 16   Ht 1.854 m (6' 1\")   Wt 99.8 kg (220 lb)   SpO2 99%   BMI 29.03 kg/m²     Labs Reviewed   COMPREHENSIVE METABOLIC PANEL - Abnormal; Notable for the following components:       Result Value    Chloride 109 (*)     Anion Gap 0 (*)     Glucose 164 (*)     BUN 24 (*)     Creatinine 1.48 (*)     Est, Glom Filt Rate 51 (*)     AST 14 (*)     Total Protein 8.3 (*)     Globulin 4.6 (*)     Albumin/Globulin Ratio 0.8 (*)     All other components within normal limits   CBC WITH AUTO DIFFERENTIAL   TROPONIN   EXTRA TUBES HOLD     No orders to display       ED Course as of 10/10/24 2225   Thu Oct 10, 2024   1239 EKG at 12:30 PM-AV dual paced rhythm, 60 bpm, no STEMI. [JM]   1503 Troponin, High Sensitivity: 20 [RS]   1504 Creatinine(!): 1.48  stable [RS]      ED Course User Index  [JM] Lanny Chau MD  [RS] Alexandro Davis MD       Diagnosis:   1. Vertigo, peripheral, left    2. Retracted ear drum, left        Disposition:   Decision To Discharge 10/10/2024 03:04:26 PM    Plan:   Patient is feeling improved and wishes to return home.  Labs appear reassuring.  Will discharge with symptomatic medications and as needed ENT follow-up.    MD Susan Cooper Ryland, MD  10/10/24 5812

## 2024-10-10 NOTE — ED PROVIDER NOTES
[facility] EMERGENCY DEPT  EMERGENCY DEPARTMENT ENCOUNTER      Pt Name: Toni Montiel  MRN: [id number]  Birthdate 1955  Date of evaluation: 10/10/2024  Provider: Lanny Chau MD    CHIEF COMPLAINT       Chief Complaint   Patient presents with    Extremity Weakness     HISTORY OF PRESENT ILLNESS   (Location/Symptom, Timing/Onset, Context/Setting, Quality, Duration, Modifying Factors, Severity)  Note limiting factors.   HPI  69-year-old male with past medical history of CAD, CHF, status post biventricular ICD implantation, PE, CKD, CHF, anxiety, PAD, presents ER for evaluation of \"lightheadedness\" described as feeling unsteady, disequilibrium, with symptoms beginning this morning.  He denies any associated chest pain, shortness of breath, palpitations.  No headaches, vision changes, nausea, vomiting.  States he has recently been experiencing left-sided ear discomfort, feeling like \"the ear needs to be popped.\" He denies difficulty with ambulation.  Denies any other concerning neurologic symptoms.    Review of External Medical Records:     Nursing Notes were reviewed.    REVIEW OF SYSTEMS    (2-9 systems for level 4, 10 or more for level 5)     Review of Systems   All other systems reviewed and are negative.      Except as noted above the remainder of the review of systems was reviewed and negative.       PAST MEDICAL HISTORY     Past Medical History:   Diagnosis Date    CAD (coronary artery disease)     Carotid artery disease (HCC)     CHF (congestive heart failure), NYHA class I, chronic, systolic (HCC)     CKD (chronic kidney disease) stage 3, GFR 30-59 ml/min (HCC)     Erectile dysfunction     Hx of deep venous thrombosis     travel provoked    Hx pulmonary embolism     travel provoked    ICD (implantable cardioverter-defibrillator) in place     Panic attacks 08/03/2023    Tobacco abuse          SURGICAL HISTORY       Past Surgical History:   Procedure Laterality Date    ABLATION OF DYSRHYTHMIC FOCUS  2021

## 2024-10-19 LAB — NONINV COLON CA DNA+OCC BLD SCRN STL QL: POSITIVE

## 2024-10-21 ENCOUNTER — PATIENT MESSAGE (OUTPATIENT)
Facility: CLINIC | Age: 69
End: 2024-10-21

## 2024-10-21 ENCOUNTER — PATIENT MESSAGE (OUTPATIENT)
Age: 69
End: 2024-10-21

## 2024-10-21 DIAGNOSIS — R19.5 POSITIVE COLORECTAL CANCER SCREENING USING COLOGUARD TEST: Primary | ICD-10-CM

## 2024-10-22 NOTE — TELEPHONE ENCOUNTER
[phone]   Okay to proceed with Colonoscopy, moderate CV risk per Gifty Benito N.P., no med holds.      Also Verified with EP NP Chari Underwood     LOV note routed in EPIC to [facility] w/ clearance.

## 2024-10-24 NOTE — TELEPHONE ENCOUNTER
Correction- appears patient possibly on Aspirin- [facility] form completed with aspirin hold recommendation per Parvin Douglas NP. Form faxed to I w/ confirmation.

## 2024-11-13 ENCOUNTER — OFFICE VISIT (OUTPATIENT)
Age: 69
End: 2024-11-13
Payer: MEDICARE

## 2024-11-13 VITALS
HEART RATE: 51 BPM | HEIGHT: 73 IN | OXYGEN SATURATION: 94 % | WEIGHT: 217 LBS | BODY MASS INDEX: 28.76 KG/M2 | DIASTOLIC BLOOD PRESSURE: 78 MMHG | SYSTOLIC BLOOD PRESSURE: 90 MMHG

## 2024-11-13 DIAGNOSIS — I25.10 CORONARY ARTERY DISEASE INVOLVING NATIVE CORONARY ARTERY OF NATIVE HEART WITHOUT ANGINA PECTORIS: ICD-10-CM

## 2024-11-13 DIAGNOSIS — I50.22 CHF (CONGESTIVE HEART FAILURE), NYHA CLASS I, CHRONIC, SYSTOLIC (HCC): ICD-10-CM

## 2024-11-13 DIAGNOSIS — Z95.810 BIVENTRICULAR ICD (IMPLANTABLE CARDIOVERTER-DEFIBRILLATOR) IN PLACE: ICD-10-CM

## 2024-11-13 DIAGNOSIS — I25.5 ISCHEMIC CARDIOMYOPATHY: Primary | ICD-10-CM

## 2024-11-13 DIAGNOSIS — N18.30 STAGE 3 CHRONIC KIDNEY DISEASE, UNSPECIFIED WHETHER STAGE 3A OR 3B CKD (HCC): ICD-10-CM

## 2024-11-13 DIAGNOSIS — I70.1 RENAL ARTERY STENOSIS (HCC): ICD-10-CM

## 2024-11-13 DIAGNOSIS — I73.9 PAD (PERIPHERAL ARTERY DISEASE) (HCC): ICD-10-CM

## 2024-11-13 DIAGNOSIS — Z86.718 HX OF DEEP VENOUS THROMBOSIS: ICD-10-CM

## 2024-11-13 DIAGNOSIS — Z86.711 HX PULMONARY EMBOLISM: ICD-10-CM

## 2024-11-13 PROCEDURE — 99215 OFFICE O/P EST HI 40 MIN: CPT | Performed by: INTERNAL MEDICINE

## 2024-11-13 NOTE — PROGRESS NOTES
had concerns including Cardiomyopathy.    Vitals:    11/13/24 0856 11/13/24 0917   BP: 90/78    Site: Left Upper Arm    Position: Sitting    Pulse: 69 51   SpO2: (!) 84% 94%   Weight: 98.4 kg (217 lb)    Height: 1.854 m (6' 1\")         Chest pain No    Refills No        1. Have you been to the ER, urgent care clinic since your last visit? No       Hospitalized since your last visit? No       Where?        Date?    had concerns including Cardiomyopathy.    Vitals:    11/13/24 0856 11/13/24 0917   BP: 90/78    Site: Left Upper Arm    Position: Sitting    Pulse: 69 51   SpO2: (!) 84% 94%   Weight: 98.4 kg (217 lb)    Height: 1.854 m (6' 1\")         Chest pain No    Refills No        1. Have you been to the ER, urgent care clinic since your last visit? No       Hospitalized since your last visit? No       Where?        Date?  
Per Dr. Alejandro- We have reached out to Device to see if there has been any recorded events since August due to syncopal episode, awaiting response.  Fup 6 mo w/ echo prior.   
Transmission dated 9-13-24, no events. Last event was 8-23-24, NSVT up to 8 sec.    
right calf pain. CTA revealed provoked bilateral PE due to history of long car travel . US with acute nonocclusive DVT of the peroneal vein on the right. Was started on eliquis. Echo revealed moderate global hypokinesis with EF 30-35%.   Denies shortness of breath. Back gets achy at times, takes tylenol with relief. Denies chest pain. Denies signs of bleeding on eliquis.         PAST MEDICAL HISTORY:     Past Medical History:   Diagnosis Date    CAD (coronary artery disease)     Carotid artery disease ([facility])     CHF (congestive heart failure), NYHA class I, chronic, systolic ([facility])     CKD (chronic kidney disease) stage 3, GFR 30-59 ml/min ([facility])     Erectile dysfunction     Hx of deep venous thrombosis     travel provoked    Hx pulmonary embolism     travel provoked    ICD (implantable cardioverter-defibrillator) in place     Panic attacks 08/03/2023    Tobacco abuse         Past Surgical History:   Procedure Laterality Date    ABLATION OF DYSRHYTHMIC FOCUS  2021    CARDIAC CATHETERIZATION      CORONARY ANGIOPLASTY WITH STENT PLACEMENT      CORONARY ARTERY BYPASS GRAFT      EP DEVICE PROCEDURE N/A 04/01/2024    Remove & replace ICD biv multi leads performed by Radha Quinones MD at [facility] CARDIAC CATH LAB    PACEMAKER      PTCA      UMBILICAL HERNIA REPAIR         CURRENT MEDICATIONS:    .  Current Outpatient Medications   Medication Sig Dispense Refill    rosuvastatin (CRESTOR) 40 MG tablet TAKE 1 TABLET BY MOUTH EVERY DAY AT NIGHT 90 tablet 0    PARoxetine (PAXIL) 20 MG tablet TAKE 1 TABLET BY MOUTH EVERY DAY 90 tablet 3    sacubitril-valsartan (ENTRESTO) 24-26 MG per tablet Take 1 tablet by mouth 2 times daily 180 tablet 3    carvedilol (COREG) 25 MG tablet TAKE 1 TABLET BY MOUTH TWO TIMES A DAY. 180 tablet 3    aspirin 81 MG chewable tablet Take 1 tablet by mouth daily      Varenicline Tartrate, Starter, 0.5 MG X 11 & 1 MG X 42 TBPK Take 0.5 mg by mouth daily for 3 days, THEN 0.5 mg in the morning and at bedtime for 4

## 2024-11-22 RX ORDER — ROSUVASTATIN CALCIUM 40 MG/1
40 TABLET, COATED ORAL NIGHTLY
Qty: 90 TABLET | Refills: 3 | Status: SHIPPED | OUTPATIENT
Start: 2024-11-22

## 2024-11-22 NOTE — TELEPHONE ENCOUNTER
Per verbal order from Dr. Alina Hill  Last appt: Visit date not found     Future Appointments   Date Time Provider Department Center   1/8/2025  8:40 AM PACEMAKER, STFRANCES [facility] BS AMB   1/8/2025  9:00 AM Radha Quinones MD [facility] AMB   1/31/2025  9:30 AM Bello Durham MD [facility] ECC DEP   5/6/2025  8:00 AM [facility] ECHO 2 [facility] BS AMB   5/28/2025  1:20 PM Alina Hill MD [facility] AMB       Requested Prescriptions     Signed Prescriptions Disp Refills    rosuvastatin (CRESTOR) 40 MG tablet 90 tablet 3     Sig: TAKE 1 TABLET BY MOUTH EVERY DAY AT NIGHT     Authorizing Provider: ALINA HILL     Ordering User: MORENA RUSSELL

## 2024-12-03 ENCOUNTER — ANESTHESIA EVENT (OUTPATIENT)
Facility: HOSPITAL | Age: 69
End: 2024-12-03
Payer: MEDICARE

## 2024-12-03 ENCOUNTER — ANESTHESIA (OUTPATIENT)
Facility: HOSPITAL | Age: 69
End: 2024-12-03
Payer: MEDICARE

## 2024-12-03 ENCOUNTER — HOSPITAL ENCOUNTER (OUTPATIENT)
Facility: HOSPITAL | Age: 69
Setting detail: OUTPATIENT SURGERY
Discharge: HOME | End: 2024-12-03
Attending: INTERNAL MEDICINE | Admitting: INTERNAL MEDICINE
Payer: MEDICARE

## 2024-12-03 VITALS
BODY MASS INDEX: 29.22 KG/M2 | SYSTOLIC BLOOD PRESSURE: 99 MMHG | HEART RATE: 60 BPM | TEMPERATURE: 98.3 F | HEIGHT: 73 IN | RESPIRATION RATE: 18 BRPM | DIASTOLIC BLOOD PRESSURE: 66 MMHG | OXYGEN SATURATION: 96 % | WEIGHT: 220.46 LBS

## 2024-12-03 PROCEDURE — 6360000002 HC RX W HCPCS: Performed by: NURSE ANESTHETIST, CERTIFIED REGISTERED

## 2024-12-03 PROCEDURE — 7100000010 HC PHASE II RECOVERY - FIRST 15 MIN: Performed by: INTERNAL MEDICINE

## 2024-12-03 PROCEDURE — 2709999900 HC NON-CHARGEABLE SUPPLY: Performed by: INTERNAL MEDICINE

## 2024-12-03 PROCEDURE — 3600007502: Performed by: INTERNAL MEDICINE

## 2024-12-03 PROCEDURE — C1889 IMPLANT/INSERT DEVICE, NOC: HCPCS | Performed by: INTERNAL MEDICINE

## 2024-12-03 PROCEDURE — 88305 TISSUE EXAM BY PATHOLOGIST: CPT

## 2024-12-03 PROCEDURE — 3600007512: Performed by: INTERNAL MEDICINE

## 2024-12-03 PROCEDURE — 3700000000 HC ANESTHESIA ATTENDED CARE: Performed by: INTERNAL MEDICINE

## 2024-12-03 PROCEDURE — 3700000001 HC ADD 15 MINUTES (ANESTHESIA): Performed by: INTERNAL MEDICINE

## 2024-12-03 PROCEDURE — 7100000011 HC PHASE II RECOVERY - ADDTL 15 MIN: Performed by: INTERNAL MEDICINE

## 2024-12-03 DEVICE — WORKING LENGTH 235CM, WORKING CHANNEL 2.8MM
Type: IMPLANTABLE DEVICE | Site: SIGMOID COLON | Status: FUNCTIONAL
Brand: RESOLUTION 360 CLIP

## 2024-12-03 RX ORDER — SODIUM CHLORIDE 0.9 % (FLUSH) 0.9 %
5-40 SYRINGE (ML) INJECTION PRN
Status: CANCELLED | OUTPATIENT
Start: 2024-12-03

## 2024-12-03 RX ORDER — SODIUM CHLORIDE 0.9 % (FLUSH) 0.9 %
5-40 SYRINGE (ML) INJECTION EVERY 12 HOURS SCHEDULED
Status: CANCELLED | OUTPATIENT
Start: 2024-12-03

## 2024-12-03 RX ORDER — PROPOFOL 10 MG/ML
INJECTION, EMULSION INTRAVENOUS
Status: DISCONTINUED | OUTPATIENT
Start: 2024-12-03 | End: 2024-12-03 | Stop reason: SDUPTHER

## 2024-12-03 RX ORDER — SODIUM CHLORIDE 9 MG/ML
INJECTION, SOLUTION INTRAVENOUS PRN
Status: CANCELLED | OUTPATIENT
Start: 2024-12-03

## 2024-12-03 RX ORDER — PHENYLEPHRINE HCL IN 0.9% NACL 0.4MG/10ML
SYRINGE (ML) INTRAVENOUS
Status: DISCONTINUED | OUTPATIENT
Start: 2024-12-03 | End: 2024-12-03 | Stop reason: SDUPTHER

## 2024-12-03 RX ORDER — LIDOCAINE HYDROCHLORIDE 20 MG/ML
INJECTION, SOLUTION INTRAVENOUS
Status: DISCONTINUED | OUTPATIENT
Start: 2024-12-03 | End: 2024-12-03 | Stop reason: SDUPTHER

## 2024-12-03 RX ADMIN — PROPOFOL 25 MG: 10 INJECTION, EMULSION INTRAVENOUS at 14:16

## 2024-12-03 RX ADMIN — Medication 80 MCG: at 14:35

## 2024-12-03 RX ADMIN — PROPOFOL 140 MCG/KG/MIN: 10 INJECTION, EMULSION INTRAVENOUS at 14:14

## 2024-12-03 RX ADMIN — LIDOCAINE HYDROCHLORIDE 50 MG: 20 INJECTION, SOLUTION INTRAVENOUS at 14:15

## 2024-12-03 RX ADMIN — PROPOFOL 70 MG: 10 INJECTION, EMULSION INTRAVENOUS at 14:15

## 2024-12-03 RX ADMIN — Medication 40 MCG: at 14:30

## 2024-12-03 RX ADMIN — PROPOFOL 25 MG: 10 INJECTION, EMULSION INTRAVENOUS at 14:17

## 2024-12-03 RX ADMIN — PROPOFOL 50 MG: 10 INJECTION, EMULSION INTRAVENOUS at 14:23

## 2024-12-03 ASSESSMENT — PAIN - FUNCTIONAL ASSESSMENT
PAIN_FUNCTIONAL_ASSESSMENT: NONE - DENIES PAIN
PAIN_FUNCTIONAL_ASSESSMENT: 0-10

## 2024-12-03 ASSESSMENT — PAIN SCALES - GENERAL
PAINLEVEL_OUTOF10: 0

## 2024-12-03 NOTE — OP NOTE
[facility]  Caleb Davenport M.D.  [phone]            12/3/2024          Colonoscopy Operative Report  Toni Montiel  :  1955  [place] Medical Record Number:  [id number]      Indications:  abnormal cologuard    :  Caleb Davenport MD    Referring Provider: Bello Durham MD    Sedation:  MAC    Pre-Procedural Exam:      Airway: clear,  No airway problems anticipated  Heart: RRR, without gallops or rubs, ICD left upper chest  Lungs: clear bilaterally without wheezes, crackles, or rhonchi  Abdomen: soft, nontender, nondistended, bowel sounds present  Mental Status: awake, alert and oriented to person, place and time     Procedure Details:  After informed consent was obtained with all risks and benefits of procedure explained and preoperative exam completed, the patient was taken to the endoscopy suite and placed in the left lateral decubitus position.  Upon sequential sedation as per above, a digital rectal exam was performed. The Olympus videocolonoscope  was inserted in the rectum and carefully advanced to the cecum, which was identified by the ileocecal valve and appendiceal orifice, terminal ileum.  The quality of preparation was good.  The colonoscope was slowly withdrawn with careful inspection and evaluation between folds. Retroflexion in the rectum was performed.  The patient tolerated the procedure well.     Findings:   Terminal Ileum: Normal  Cecum: 2-3 mm sessile polyp removed in 1 piece with a cold snare.   Ascending Colon: Normal  Transverse Colon:  10 sessile and semi-sessile polyps ranging in size from 1 to 10 mm were removed in 1 piece each with a cold snare.  Descending Colon:  Many scattered diverticula of mixed sizes from small to large were found in the descending and sigmoid colon. No evidence of diverticulitis or bleeding.   Sigmoid:  Two pedunculated polyps ranging in size from 7 to 14 mm were immediately adjacent to each other in the mid to

## 2024-12-03 NOTE — ANESTHESIA POSTPROCEDURE EVALUATION
Department of Anesthesiology  Postprocedure Note    Patient: Toni Montiel  MRN: [id number]  YOB: 1955  Date of evaluation: 12/3/2024    Procedure Summary       Date: 12/03/24 Room / Location: [place] [address] / [facility] ENDOSCOPY    Anesthesia Start: 1408 Anesthesia Stop: 1451    Procedures:       COLONOSCOPY DIAGNOSTIC (Lower GI Region)      COLONOSCOPY POLYPECTOMY REMOVAL SNARE/STOMA (Lower GI Region)      COLONOSCOPY CONTROL HEMORRHAGE (Lower GI Region) Diagnosis:       Positive occult stool blood test      (Positive occult stool blood test [R19.5])    Surgeons: Caleb Costa MD Responsible Provider: Alireza Rebolledo MD    Anesthesia Type: MAC ASA Status: 3            Anesthesia Type: MAC    Laurence Phase I: Laurence Score: 10    Laurence Phase II: Laurence Score: 8    Anesthesia Post Evaluation    Patient location during evaluation: PACU  Patient participation: complete - patient participated  Level of consciousness: awake and alert  Pain score: 0  Airway patency: patent  Nausea & Vomiting: no nausea and no vomiting  Cardiovascular status: blood pressure returned to baseline  Respiratory status: acceptable  Hydration status: euvolemic    No notable events documented.

## 2024-12-03 NOTE — ANESTHESIA PRE PROCEDURE
Department of Anesthesiology  Preprocedure Note       Name:  Toni Montiel   Age:  69 y.o.  :  1955                                          MRN:  [id number]         Date:  12/3/2024      Surgeon: Surgeon(s):  Caleb Costa MD    Procedure: Procedure(s):  COLONOSCOPY DIAGNOSTIC    Medications prior to admission:   Prior to Admission medications    Medication Sig Start Date End Date Taking? Authorizing Provider   rosuvastatin (CRESTOR) 40 MG tablet TAKE 1 TABLET BY MOUTH EVERY DAY AT NIGHT 24  Yes Alina Alejandro MD   PARoxetine (PAXIL) 20 MG tablet TAKE 1 TABLET BY MOUTH EVERY DAY 24  Yes Bello Durham MD   sacubitril-valsartan (ENTRESTO) 24-26 MG per tablet Take 1 tablet by mouth 2 times daily 3/18/24  Yes Alina Alejandro MD   carvedilol (COREG) 25 MG tablet TAKE 1 TABLET BY MOUTH TWO TIMES A DAY. 3/5/24  Yes Alina Alejandro MD   aspirin 81 MG chewable tablet Take 1 tablet by mouth daily   Yes Provider, MD Karsten   Varenicline Tartrate, Starter, 0.5 MG X 11 & 1 MG X 42 TBPK Take 0.5 mg by mouth daily for 3 days, THEN 0.5 mg in the morning and at bedtime for 4 days, THEN 1 mg in the morning and at bedtime.  Patient not taking: Reported on 2024  Bello Durham MD       Current medications:    No current facility-administered medications for this encounter.       Allergies:  No Known Allergies    Problem List:    Patient Active Problem List   Diagnosis Code   • PAD (peripheral artery disease) ([facility]) I73.9   • Renal artery stenosis ([facility]) I70.1   • Coronary artery disease involving native heart I25.10   • Congestive heart failure ([facility]) I50.9   • Biventricular ICD (implantable cardioverter-defibrillator) in place Z95.810   • Ischemic cardiomyopathy I25.5   • Panic attacks F41.0   • Hemoptysis R04.2   • Hx pulmonary embolism Z86.711   • Hx of deep venous thrombosis Z86.718   • CKD (chronic kidney disease) stage 3, GFR 30-59 ml/min ([facility]) N18.30   • CHF (congestive

## 2024-12-03 NOTE — DISCHARGE INSTRUCTIONS
questions or problems.  Biopsy results will be available in about 14 days. If you have not heard about your results within 2-3 weeks, please call the office.

## 2024-12-03 NOTE — H&P
NEURO-a&o   HEENT-wnl   LUNGS-clear, normal effort   COR-regular rate and rhythym, ICD left upper chest   ABD-soft , no tenderness, no rebound, good bs   EXT-no edema     Data Review     No results for input(s): \"WBC\", \"HGB\", \"HCT\", \"PLT\" in the last 72 hours.  No results for input(s): \"NA\", \"K\", \"CL\", \"CO2\", \"BUN\", \"GLU\", \"PHOS\" in the last 72 hours.    Invalid input(s): \"CREA\", \"CA\"  No results for input(s): \"TP\", \"GLOB\", \"GGT\" in the last 72 hours.    Invalid input(s): \"SGOT\", \"GPT\", \"AP\", \"TBIL\", \"ALB\", \"AML\", \"AMYP\", \"LPSE\", \"HLPSE\"  No results for input(s): \"INR\", \"APTT\" in the last 72 hours.    Invalid input(s): \"PTP\"    Patient Active Problem List   Diagnosis    PAD (peripheral artery disease) ([facility])    Renal artery stenosis ([facility])    Coronary artery disease involving native heart    Congestive heart failure ([facility])    Biventricular ICD (implantable cardioverter-defibrillator) in place    Ischemic cardiomyopathy    Panic attacks    Hemoptysis    Hx pulmonary embolism    Hx of deep venous thrombosis    CKD (chronic kidney disease) stage 3, GFR 30-59 ml/min ([facility])    CHF (congestive heart failure), NYHA class I, chronic, systolic ([facility])    Chronic systolic heart failure ([facility])    Pre-operative cardiovascular examination, ICD in place    Chronic right-sided low back pain without sciatica    Generalized anxiety disorder with panic attacks    Prediabetes      Assessment:     + cologuard   Plan:     colonoscopy   Under MAC     Signed By: Caleb Davenport MD     12/3/2024  1:21 PM

## 2024-12-18 ENCOUNTER — PATIENT MESSAGE (OUTPATIENT)
Age: 69
End: 2024-12-18

## 2024-12-18 ENCOUNTER — PATIENT MESSAGE (OUTPATIENT)
Facility: CLINIC | Age: 69
End: 2024-12-18

## 2024-12-18 DIAGNOSIS — N18.30 STAGE 3 CHRONIC KIDNEY DISEASE, UNSPECIFIED WHETHER STAGE 3A OR 3B CKD (HCC): ICD-10-CM

## 2024-12-18 DIAGNOSIS — I70.1 RENAL ARTERY STENOSIS (HCC): Primary | ICD-10-CM

## 2024-12-18 DIAGNOSIS — I73.9 PAD (PERIPHERAL ARTERY DISEASE) (HCC): ICD-10-CM

## 2025-01-07 PROBLEM — I47.20 VENTRICULAR TACHYCARDIA (HCC): Status: ACTIVE | Noted: 2025-01-07

## 2025-01-08 ENCOUNTER — OFFICE VISIT (OUTPATIENT)
Age: 70
End: 2025-01-08
Payer: MEDICARE

## 2025-01-08 ENCOUNTER — PROCEDURE VISIT (OUTPATIENT)
Age: 70
End: 2025-01-08
Payer: MEDICARE

## 2025-01-08 VITALS
OXYGEN SATURATION: 95 % | DIASTOLIC BLOOD PRESSURE: 58 MMHG | SYSTOLIC BLOOD PRESSURE: 114 MMHG | HEART RATE: 64 BPM | HEIGHT: 73 IN | RESPIRATION RATE: 16 BRPM | WEIGHT: 220 LBS | BODY MASS INDEX: 29.16 KG/M2

## 2025-01-08 DIAGNOSIS — R73.01 ELEVATED FASTING GLUCOSE: ICD-10-CM

## 2025-01-08 DIAGNOSIS — Z12.5 PROSTATE CANCER SCREENING: ICD-10-CM

## 2025-01-08 DIAGNOSIS — I25.5 ISCHEMIC CARDIOMYOPATHY: ICD-10-CM

## 2025-01-08 DIAGNOSIS — I50.22 CHRONIC SYSTOLIC HEART FAILURE (HCC): ICD-10-CM

## 2025-01-08 DIAGNOSIS — R73.03 PREDIABETES: ICD-10-CM

## 2025-01-08 DIAGNOSIS — I73.9 PAD (PERIPHERAL ARTERY DISEASE) (HCC): ICD-10-CM

## 2025-01-08 DIAGNOSIS — Z95.810 BIVENTRICULAR ICD (IMPLANTABLE CARDIOVERTER-DEFIBRILLATOR) IN PLACE: Primary | ICD-10-CM

## 2025-01-08 DIAGNOSIS — Z95.810 BIVENTRICULAR ICD (IMPLANTABLE CARDIOVERTER-DEFIBRILLATOR) IN PLACE: ICD-10-CM

## 2025-01-08 DIAGNOSIS — I47.20 VENTRICULAR TACHYCARDIA (HCC): Primary | ICD-10-CM

## 2025-01-08 DIAGNOSIS — E78.1 HYPERTRIGLYCERIDEMIA: ICD-10-CM

## 2025-01-08 DIAGNOSIS — N18.30 STAGE 3 CHRONIC KIDNEY DISEASE, UNSPECIFIED WHETHER STAGE 3A OR 3B CKD (HCC): ICD-10-CM

## 2025-01-08 DIAGNOSIS — I25.10 CORONARY ARTERY DISEASE INVOLVING NATIVE CORONARY ARTERY OF NATIVE HEART WITHOUT ANGINA PECTORIS: ICD-10-CM

## 2025-01-08 LAB
ALBUMIN SERPL-MCNC: 3.7 G/DL (ref 3.5–5)
ALBUMIN/GLOB SERPL: 0.9 (ref 1.1–2.2)
ALP SERPL-CCNC: 83 U/L (ref 45–117)
ALT SERPL-CCNC: 16 U/L (ref 12–78)
ANION GAP SERPL CALC-SCNC: 5 MMOL/L (ref 2–12)
APPEARANCE UR: CLEAR
AST SERPL-CCNC: 14 U/L (ref 15–37)
BACTERIA URNS QL MICRO: NEGATIVE /HPF
BILIRUB SERPL-MCNC: 0.5 MG/DL (ref 0.2–1)
BILIRUB UR QL: NEGATIVE
BUN SERPL-MCNC: 21 MG/DL (ref 6–20)
BUN/CREAT SERPL: 14 (ref 12–20)
CALCIUM SERPL-MCNC: 10.1 MG/DL (ref 8.5–10.1)
CHLORIDE SERPL-SCNC: 109 MMOL/L (ref 97–108)
CHOLEST SERPL-MCNC: 110 MG/DL
CO2 SERPL-SCNC: 29 MMOL/L (ref 21–32)
COLOR UR: ABNORMAL
CREAT SERPL-MCNC: 1.55 MG/DL (ref 0.7–1.3)
EPITH CASTS URNS QL MICRO: ABNORMAL /LPF
ERYTHROCYTE [DISTWIDTH] IN BLOOD BY AUTOMATED COUNT: 13.9 % (ref 11.5–14.5)
EST. AVERAGE GLUCOSE BLD GHB EST-MCNC: 131 MG/DL
GLOBULIN SER CALC-MCNC: 4.1 G/DL (ref 2–4)
GLUCOSE SERPL-MCNC: 116 MG/DL (ref 65–100)
GLUCOSE UR STRIP.AUTO-MCNC: NEGATIVE MG/DL
HBA1C MFR BLD: 6.2 % (ref 4–5.6)
HCT VFR BLD AUTO: 50.1 % (ref 36.6–50.3)
HDLC SERPL-MCNC: 33 MG/DL
HDLC SERPL: 3.3 (ref 0–5)
HGB BLD-MCNC: 16 G/DL (ref 12.1–17)
HGB UR QL STRIP: NEGATIVE
HYALINE CASTS URNS QL MICRO: ABNORMAL /LPF (ref 0–5)
KETONES UR QL STRIP.AUTO: NEGATIVE MG/DL
LDLC SERPL CALC-MCNC: 51 MG/DL (ref 0–100)
LEUKOCYTE ESTERASE UR QL STRIP.AUTO: NEGATIVE
MCH RBC QN AUTO: 30.8 PG (ref 26–34)
MCHC RBC AUTO-ENTMCNC: 31.9 G/DL (ref 30–36.5)
MCV RBC AUTO: 96.5 FL (ref 80–99)
NITRITE UR QL STRIP.AUTO: NEGATIVE
NRBC # BLD: 0 K/UL (ref 0–0.01)
NRBC BLD-RTO: 0 PER 100 WBC
PH UR STRIP: 5 (ref 5–8)
PLATELET # BLD AUTO: 139 K/UL (ref 150–400)
PMV BLD AUTO: 10.9 FL (ref 8.9–12.9)
POTASSIUM SERPL-SCNC: 4.7 MMOL/L (ref 3.5–5.1)
PROT SERPL-MCNC: 7.8 G/DL (ref 6.4–8.2)
PROT UR STRIP-MCNC: 30 MG/DL
PSA SERPL-MCNC: 3.9 NG/ML (ref 0.01–4)
RBC # BLD AUTO: 5.19 M/UL (ref 4.1–5.7)
RBC #/AREA URNS HPF: ABNORMAL /HPF (ref 0–5)
SODIUM SERPL-SCNC: 143 MMOL/L (ref 136–145)
SP GR UR REFRACTOMETRY: 1.02 (ref 1–1.03)
TRIGL SERPL-MCNC: 130 MG/DL
URINE CULTURE IF INDICATED: ABNORMAL
UROBILINOGEN UR QL STRIP.AUTO: 0.2 EU/DL (ref 0.2–1)
VLDLC SERPL CALC-MCNC: 26 MG/DL
WBC # BLD AUTO: 9.3 K/UL (ref 4.1–11.1)
WBC URNS QL MICRO: ABNORMAL /HPF (ref 0–4)

## 2025-01-08 PROCEDURE — 99214 OFFICE O/P EST MOD 30 MIN: CPT | Performed by: INTERNAL MEDICINE

## 2025-01-08 PROCEDURE — 93289 INTERROG DEVICE EVAL HEART: CPT | Performed by: INTERNAL MEDICINE

## 2025-01-08 PROCEDURE — 93290 INTERROG DEV EVAL ICPMS IP: CPT | Performed by: INTERNAL MEDICINE

## 2025-01-08 ASSESSMENT — PATIENT HEALTH QUESTIONNAIRE - PHQ9
SUM OF ALL RESPONSES TO PHQ QUESTIONS 1-9: 0
2. FEELING DOWN, DEPRESSED OR HOPELESS: NOT AT ALL
SUM OF ALL RESPONSES TO PHQ9 QUESTIONS 1 & 2: 0
SUM OF ALL RESPONSES TO PHQ QUESTIONS 1-9: 0
1. LITTLE INTEREST OR PLEASURE IN DOING THINGS: NOT AT ALL

## 2025-01-08 NOTE — PATIENT INSTRUCTIONS
Continue remote transmission every 3 months  FU with NP in 1 year  FU with Dr. Quinones in 2 years

## 2025-01-08 NOTE — PROGRESS NOTES
Cardiac Electrophysiology Office Follow-up    NAME:  Toni Montiel   :  1955  MRM:  [id number]    Date:  2025     Primary Cardiologist: Javon    Assessment and Plan:     1. Ventricular tachycardia (HCC)  2. Chronic systolic heart failure (HCC)  3. Ischemic cardiomyopathy  4. Biventricular ICD (implantable cardioverter-defibrillator) in place  5. Coronary artery disease involving native coronary artery of native heart without angina pectoris  6. PAD (peripheral artery disease) (HCC)       Ventricular tachycardia  Previously on amiodarone and mexiletine, stopped   with admission 2018 ventricular tachycardia. Mexiletine resumed, ICD parameters changed  2020 ventricular tachycardia storm  2020 ventricular tachycardia ablation  No recurrent sustained VT or ICD shock, now off of AAD  Recent hospitalization did not correlate with any arrhythmias on interrogation  - cont Carvedilol  - remote tranmission every 3 months  - monthly thoracic impedance transmission      [employer] Biventricular ICD (DOI RV lead 1/10/2008, RA/LV lead 2017, generator 2024): Device check today shows proper lead & generator function. Generator longevity estimated 7.2 years. RA 59% BIV pacing 97 %. AT/AF burden <1%. No sustained VT/VF.  Iterative programming was performed to test the leads sensing and threshold parameters without any permanent changes.  - S/p generator change 2024  - Continue remote device transmissions every 3 months  - Follow-up in EP clinic in 1 year or sooner with any concerns     Coronary artery disease  1994 non-STEMI   CABG LIMA LAD, SVG RCA, SVG OMB  ? LHC: LIMA to LAD occluded, stent to LAD and CX. (No report available)   10/2017 nuc stress: EF 38%, large partially rev infapical defect c/w MI, mild brady-infarct ischemia  2018 admission with chest discomfort and ventricular tachycardia  2018 LHC: LM nl, LAD 40%, CX irreg, OM1 70% small vessel, %,    LIMA to LAD

## 2025-01-08 NOTE — PROGRESS NOTES
Room #: 3    Chief Complaint   Patient presents with    Follow-up    Device Check       Vitals:    01/08/25 0848   BP: (!) 114/58   Pulse: 64   Resp: 16   SpO2: 95%   Weight: 99.8 kg (220 lb)   Height: 1.854 m (6' 1\")         Chest pain:  NO    Have you been to the ER, urgent care, or hospitalized outside of [facility] since your last visit?   NO    Refills:  NO

## 2025-01-09 LAB — TSH SERPL DL<=0.05 MIU/L-ACNC: 2.94 UIU/ML (ref 0.45–4.5)

## 2025-01-11 ENCOUNTER — HOSPITAL ENCOUNTER (OUTPATIENT)
Facility: HOSPITAL | Age: 70
Discharge: HOME | End: 2025-01-14
Attending: FAMILY MEDICINE
Payer: MEDICARE

## 2025-01-11 ENCOUNTER — HOSPITAL ENCOUNTER (OUTPATIENT)
Dept: VASCULAR SURGERY | Facility: HOSPITAL | Age: 70
Discharge: HOME | End: 2025-01-13
Attending: FAMILY MEDICINE
Payer: MEDICARE

## 2025-01-11 DIAGNOSIS — I70.1 RENAL ARTERY STENOSIS (HCC): ICD-10-CM

## 2025-01-11 DIAGNOSIS — N18.30 STAGE 3 CHRONIC KIDNEY DISEASE, UNSPECIFIED WHETHER STAGE 3A OR 3B CKD (HCC): ICD-10-CM

## 2025-01-11 DIAGNOSIS — Z13.6 SCREENING FOR AAA (ABDOMINAL AORTIC ANEURYSM): ICD-10-CM

## 2025-01-11 DIAGNOSIS — F17.210 NICOTINE DEPENDENCE, CIGARETTES, UNCOMPLICATED: ICD-10-CM

## 2025-01-11 DIAGNOSIS — I73.9 PAD (PERIPHERAL ARTERY DISEASE) (HCC): ICD-10-CM

## 2025-01-11 LAB
VAS AORTA MID PSV: 61.8 CM/S
VAS AORTA PROX PSV: 68.4 CM/S
VAS L RENAL ORIG RI: 0.78
VAS LEFT KIDNEY LENGTH: 127 CM
VAS LEFT KIDNEY WIDTH: 5.1 CM
VAS LEFT RENAL DIST EDV: 35.3 CM/S
VAS LEFT RENAL DIST PSV: 151.7 CM/S
VAS LEFT RENAL DIST RAR: 2.45
VAS LEFT RENAL DIST RI: 0.77
VAS LEFT RENAL LOWER PARENCHYMA EDV: 5.4 CM/S
VAS LEFT RENAL LOWER PARENCHYMA PSV: 19.2 CM/S
VAS LEFT RENAL LOWER PARENCHYMA RI: 0.72
VAS LEFT RENAL MID EDV: 30.8 CM/S
VAS LEFT RENAL MID PSV: 155.2 CM/S
VAS LEFT RENAL MID RAR: 2.51
VAS LEFT RENAL MID RI: 0.8
VAS LEFT RENAL MIDDLE PARENCHYMA EDV: 4.9 CM/S
VAS LEFT RENAL MIDDLE PARENCHYMA PSV: 18.1 CM/S
VAS LEFT RENAL MIDDLE PARENCHYMA RI: 0.73
VAS LEFT RENAL ORIGIN EDV: 38.6 CM/S
VAS LEFT RENAL ORIGIN PSV: 178.5 CM/S
VAS LEFT RENAL ORIGIN RAR: 2.89
VAS LEFT RENAL PROX EDV: 17.9 CM/S
VAS LEFT RENAL PROX PSV: 150 CM/S
VAS LEFT RENAL PROX RAR: 2.43
VAS LEFT RENAL PROX RI: 0.88
VAS LEFT RENAL RAR: 2.89
VAS LEFT RENAL UPPER PARENCHYMA EDV: 3.8 CM/S
VAS LEFT RENAL UPPER PARENCHYMA PSV: 17.5 CM/S
VAS LEFT RENAL UPPER PARENCHYMA RI: 0.78
VAS RIGHT RENAL DIST EDV: 24.1 CM/S
VAS RIGHT RENAL DIST PSV: 89.9 CM/S
VAS RIGHT RENAL DIST RAR: 1.45
VAS RIGHT RENAL DIST RI: 0.73
VAS RIGHT RENAL LOWER PARENCHYMA EDV: 7.1 CM/S
VAS RIGHT RENAL LOWER PARENCHYMA PSV: 21.7 CM/S
VAS RIGHT RENAL LOWER PARENCHYMA RI: 0.67
VAS RIGHT RENAL MID EDV: 27.6 CM/S
VAS RIGHT RENAL MID PSV: 173.5 CM/S
VAS RIGHT RENAL MID RAR: 2.81
VAS RIGHT RENAL MID RI: 0.84
VAS RIGHT RENAL MIDDLE PARENCHYMA EDV: 6.2 CM/S
VAS RIGHT RENAL MIDDLE PARENCHYMA PSV: 19 CM/S
VAS RIGHT RENAL MIDDLE PARENCHYMA RI: 0.67
VAS RIGHT RENAL ORIGIN EDV: 46.7 CM/S
VAS RIGHT RENAL ORIGIN PSV: 227.2 CM/S
VAS RIGHT RENAL ORIGIN RAR: 3.68
VAS RIGHT RENAL ORIGIN RI: 0.79
VAS RIGHT RENAL PROX EDV: 23.2 CM/S
VAS RIGHT RENAL PROX PSV: 191.9 CM/S
VAS RIGHT RENAL PROX RAR: 3.11
VAS RIGHT RENAL PROX RI: 0.88
VAS RIGHT RENAL RAR: 3.68
VAS RIGHT RENAL UPPER PARENCHYMA EDV: 6.2 CM/S
VAS RIGHT RENAL UPPER PARENCHYMA PSV: 11.7 CM/S
VAS RIGHT RENAL UPPER PARENCHYMA RI: 0.47

## 2025-01-11 PROCEDURE — 93975 VASCULAR STUDY: CPT

## 2025-01-11 PROCEDURE — 76706 US ABDL AORTA SCREEN AAA: CPT

## 2025-01-16 ENCOUNTER — PATIENT MESSAGE (OUTPATIENT)
Age: 70
End: 2025-01-16

## 2025-01-16 RX ORDER — ROSUVASTATIN CALCIUM 40 MG/1
40 TABLET, COATED ORAL NIGHTLY
Qty: 90 TABLET | Refills: 3 | Status: SHIPPED | OUTPATIENT
Start: 2025-01-16

## 2025-01-16 RX ORDER — CARVEDILOL 25 MG/1
25 TABLET ORAL 2 TIMES DAILY
Qty: 180 TABLET | Refills: 3 | Status: SHIPPED | OUTPATIENT
Start: 2025-01-16

## 2025-01-16 NOTE — TELEPHONE ENCOUNTER
Per verbal order from Dr. Alina Hill  Last appt: 1/8/2025     Future Appointments   Date Time Provider Department Center   1/31/2025  9:30 AM Bello Durham MD [facility] ECC DEP   5/6/2025  8:00 AM [facility] ECHO 2 [facility] BS AMB   5/28/2025  1:20 PM [facility]   1/16/2026  8:40 AM PACEMAKER, STFRANCES [facility] BS AMB   1/16/2026  9:00 AM [facility]       Requested Prescriptions     Signed Prescriptions Disp Refills    carvedilol (COREG) 25 MG tablet 180 tablet 3     Sig: Take 1 tablet by mouth 2 times daily     Authorizing Provider: ALINA HILL     Ordering User: MORENA RUSSELL    rosuvastatin (CRESTOR) 40 MG tablet 90 tablet 3     Sig: Take 1 tablet by mouth nightly     Authorizing Provider: ALINA HILL     Ordering User: MORENA RUSSELL    sacubitril-valsartan (ENTRESTO) 24-26 MG per tablet 180 tablet 3     Sig: Take 1 tablet by mouth 2 times daily     Authorizing Provider: ALINA HILL     Ordering User: MORENA RUSSELL

## 2025-01-28 SDOH — ECONOMIC STABILITY: FOOD INSECURITY: WITHIN THE PAST 12 MONTHS, THE FOOD YOU BOUGHT JUST DIDN'T LAST AND YOU DIDN'T HAVE MONEY TO GET MORE.: NEVER TRUE

## 2025-01-28 SDOH — ECONOMIC STABILITY: FOOD INSECURITY: WITHIN THE PAST 12 MONTHS, YOU WORRIED THAT YOUR FOOD WOULD RUN OUT BEFORE YOU GOT MONEY TO BUY MORE.: NEVER TRUE

## 2025-01-28 SDOH — ECONOMIC STABILITY: INCOME INSECURITY: IN THE LAST 12 MONTHS, WAS THERE A TIME WHEN YOU WERE NOT ABLE TO PAY THE MORTGAGE OR RENT ON TIME?: NO

## 2025-01-28 SDOH — ECONOMIC STABILITY: TRANSPORTATION INSECURITY
IN THE PAST 12 MONTHS, HAS THE LACK OF TRANSPORTATION KEPT YOU FROM MEDICAL APPOINTMENTS OR FROM GETTING MEDICATIONS?: NO

## 2025-01-31 ENCOUNTER — OFFICE VISIT (OUTPATIENT)
Facility: CLINIC | Age: 70
End: 2025-01-31

## 2025-01-31 VITALS
TEMPERATURE: 97.5 F | BODY MASS INDEX: 29.1 KG/M2 | HEART RATE: 62 BPM | RESPIRATION RATE: 18 BRPM | OXYGEN SATURATION: 99 % | HEIGHT: 73 IN | DIASTOLIC BLOOD PRESSURE: 69 MMHG | SYSTOLIC BLOOD PRESSURE: 114 MMHG | WEIGHT: 219.6 LBS

## 2025-01-31 DIAGNOSIS — R73.03 PREDIABETES: ICD-10-CM

## 2025-01-31 DIAGNOSIS — N18.31 STAGE 3A CHRONIC KIDNEY DISEASE (HCC): ICD-10-CM

## 2025-01-31 DIAGNOSIS — D89.2 SERUM GAMMAGLOBULIN INCREASED: ICD-10-CM

## 2025-01-31 DIAGNOSIS — I70.1 RENAL ARTERY STENOSIS (HCC): Primary | ICD-10-CM

## 2025-01-31 DIAGNOSIS — E78.2 MIXED HYPERLIPIDEMIA: ICD-10-CM

## 2025-01-31 PROBLEM — Z95.1 HX OF CABG: Status: ACTIVE | Noted: 2018-12-28

## 2025-01-31 ASSESSMENT — PATIENT HEALTH QUESTIONNAIRE - PHQ9
1. LITTLE INTEREST OR PLEASURE IN DOING THINGS: NOT AT ALL
SUM OF ALL RESPONSES TO PHQ QUESTIONS 1-9: 0
2. FEELING DOWN, DEPRESSED OR HOPELESS: NOT AT ALL
SUM OF ALL RESPONSES TO PHQ9 QUESTIONS 1 & 2: 0
SUM OF ALL RESPONSES TO PHQ QUESTIONS 1-9: 0

## 2025-01-31 NOTE — PROGRESS NOTES
Chief Complaint   Patient presents with    Back Pain     Follow up.    Follow-up     Pre diabetes.    Discuss Labs     Review.     1. Have you been to the ER, urgent care clinic since your last visit?  Hospitalized since your last visit?No    2. Have you seen or consulted any other health care providers outside of the [facility] since your last visit?  Include any pap smears or colon screening. No

## 2025-01-31 NOTE — PROGRESS NOTES
Assessment & Plan  1. Prediabetes.  His A1c levels have shown significant improvement, currently at 6.2, down from the previous level. He has made dietary changes, eliminating junk food, soda, and white bread, and replacing sugary cereals with healthier options like Wheaties and Cheerios. He has also lost about 12 pounds, now weighing between 212 and 214 pounds. He is advised to continue his current dietary regimen and weight loss efforts. No medication is prescribed for prediabetes at this time.    2. Hypercholesterolemia.  His cholesterol levels have improved, with a significant reduction in triglycerides. He is currently on Crestor 40 mg, which he takes regularly. Given his history of ischemic cardiomyopathy and coronary artery disease, it is crucial to maintain his cholesterol levels within the normal range. He is advised to continue his current medication regimen and dietary changes.    3. Tobacco use.  He has a prescription for Chantix to aid in smoking cessation but has not yet picked it up. He decided to focus on dietary changes first to avoid taking on too much at once. He is encouraged to revisit the idea of quitting smoking and to use Chantix when he feels ready.    4. Health Maintenance.  He underwent a colonoscopy and was advised to repeat it in a year. He also had an abdominal aortic aneurysm screening, which showed no evidence of an aneurysm, and a kidney ultrasound.    5. CKD stage 3  6. Hypergammaglobulinemia  7. Renal artery stenosis.  CKD is stable, but stenosis progressed on R side renal artery  to >60%, see vascular team to discuss. Also double check that gammaglobulinemia is 2/2 renal disease and not primary.    Follow-up  The patient will follow up in 6 months.    PROCEDURE  The patient underwent a colonoscopy.    It was a pleasure seeing Mr. Toni Montiel today.  No follow-ups on file.    History of Present Illness  The patient is a 69-year-old male who presents today for laboratory follow-up

## 2025-02-26 RX ORDER — SACUBITRIL AND VALSARTAN 24; 26 MG/1; MG/1
1 TABLET, FILM COATED ORAL 2 TIMES DAILY
Qty: 180 TABLET | Refills: 1 | Status: SHIPPED | OUTPATIENT
Start: 2025-02-26

## 2025-03-10 ENCOUNTER — TELEPHONE (OUTPATIENT)
Age: 70
End: 2025-03-10

## 2025-03-10 NOTE — TELEPHONE ENCOUNTER
Called pt. ID verified using two patient identifiers. Pt requesting to come to office for device check. States his home monitor has still not been delivered and he feels like his heart is racing, some lightheadedness, and also right arm tingling. Appointment scheduled. Pt states he will send transmission from home if transmitter arrives today, but will otherwise be seen tomorrow.    Future Appointments   Date Time Provider Department Center   3/11/2025 10:00 AM PACEMAKER, [facility]FRANCES [facility] BS AMB   5/6/2025  8:00 AM [facility] ECHO 2 [facility] BS AMB   5/28/2025  1:20 PM Alina Alejandro MD [facility] BS AMB   8/1/2025  8:00 AM Bello Durham MD [facility] DEP   1/16/2026  8:40 AM PACEMAKER, [facility]FRANCES [facility] BS AMB   1/16/2026  9:00 AM Chari Underwood APRN - NP [facility] BS AMB         Opportunities for questions, clarifications, and concerns provided.  Pt expressed understanding.

## 2025-03-11 ENCOUNTER — PROCEDURE VISIT (OUTPATIENT)
Age: 70
End: 2025-03-11
Payer: MEDICARE

## 2025-03-11 DIAGNOSIS — Z95.810 BIVENTRICULAR ICD (IMPLANTABLE CARDIOVERTER-DEFIBRILLATOR) IN PLACE: Primary | ICD-10-CM

## 2025-03-11 PROCEDURE — 93289 INTERROG DEVICE EVAL HEART: CPT | Performed by: INTERNAL MEDICINE

## 2025-03-13 ENCOUNTER — TELEPHONE (OUTPATIENT)
Facility: CLINIC | Age: 70
End: 2025-03-13

## 2025-03-13 NOTE — TELEPHONE ENCOUNTER
----- Message from TATYANA ALANIS MA sent at 3/13/2025  9:51 AM EDT -----  Regarding: Q about msg from 2 days ago  Hi - I'm glad to call him now, but he needs an appt per DrG.  Is it ok to call him w/o setting an appt (I'm not authorized to make/chg appts).  Thanks.  TK  ----- Message -----  From: Bello Durham MD  Sent: 3/11/2025   5:06 PM EDT  To: #  Subject: FW: Concerning symptoms                          : patient needs a visit to discuss symptoms more.  Nursing: reach out to patient and reiterate that if symptoms return he should present to the ER/call 9-1-1. If symptoms are absent of headache, he should also crush and take 4 baby aspirins by mouth.  ----- Message -----  From: Karly Flores RN  Sent: 3/11/2025  10:27 AM EDT  To: Alina Alejandro MD; Bello Durham MD  Subject: Concerning symptoms                              Mr. Montiel was in the EP clinic today for a device check.  He called yesterday complaining of racing heart beats, lightheadedness and right arm numbness.    Device transmission showed no events to correlate with his symptoms.      He said for the last week or so he has not been feeling well most days.  Intermittent facial numbness and right arm numbness.  Denied symptoms while in the office.  He is taking his medications as prescribed, staying hydrated and remaining active.    I told him I would reach out to you in case you had any recommendations.    Recommended he proceed to the emergency room for evaluation if his symptoms return.

## 2025-03-13 NOTE — TELEPHONE ENCOUNTER
----- Message from Dr. Bello Durham MD sent at 3/11/2025  5:03 PM EDT -----  Regarding: FW: Concerning symptoms  : patient needs a visit to discuss symptoms more.  Nursing: reach out to patient and reiterate that if symptoms return he should present to the ER/call 9-1-1. If symptoms are absent of headache, he should also crush and take 4 baby aspirins by mouth.  ----- Message -----  From: Karly Flores RN  Sent: 3/11/2025  10:27 AM EDT  To: Alina Alejandro MD; Bello Durham MD  Subject: Concerning symptoms                              Mr. Montiel was in the EP clinic today for a device check.  He called yesterday complaining of racing heart beats, lightheadedness and right arm numbness.    Device transmission showed no events to correlate with his symptoms.      He said for the last week or so he has not been feeling well most days.  Intermittent facial numbness and right arm numbness.  Denied symptoms while in the office.  He is taking his medications as prescribed, staying hydrated and remaining active.    I told him I would reach out to you in case you had any recommendations.    Recommended he proceed to the emergency room for evaluation if his symptoms return.

## 2025-03-20 ENCOUNTER — OFFICE VISIT (OUTPATIENT)
Facility: CLINIC | Age: 70
End: 2025-03-20

## 2025-03-20 VITALS
OXYGEN SATURATION: 99 % | SYSTOLIC BLOOD PRESSURE: 110 MMHG | WEIGHT: 218 LBS | DIASTOLIC BLOOD PRESSURE: 76 MMHG | HEIGHT: 73 IN | HEART RATE: 72 BPM | BODY MASS INDEX: 28.89 KG/M2 | RESPIRATION RATE: 16 BRPM | TEMPERATURE: 97.7 F

## 2025-03-20 DIAGNOSIS — F41.1 GENERALIZED ANXIETY DISORDER WITH PANIC ATTACKS: ICD-10-CM

## 2025-03-20 DIAGNOSIS — F41.0 GENERALIZED ANXIETY DISORDER WITH PANIC ATTACKS: ICD-10-CM

## 2025-03-20 DIAGNOSIS — R20.2 NUMBNESS AND TINGLING OF RIGHT ARM: Primary | ICD-10-CM

## 2025-03-20 DIAGNOSIS — R42 INTERMITTENT LIGHTHEADEDNESS: ICD-10-CM

## 2025-03-20 DIAGNOSIS — R20.0 NUMBNESS AND TINGLING OF RIGHT ARM: Primary | ICD-10-CM

## 2025-03-20 DIAGNOSIS — I25.5 ISCHEMIC CARDIOMYOPATHY: ICD-10-CM

## 2025-03-20 DIAGNOSIS — I50.22 CHF (CONGESTIVE HEART FAILURE), NYHA CLASS I, CHRONIC, SYSTOLIC (HCC): ICD-10-CM

## 2025-03-20 DIAGNOSIS — G45.9 TIA (TRANSIENT ISCHEMIC ATTACK): ICD-10-CM

## 2025-03-20 NOTE — PROGRESS NOTES
Chief Complaint   Patient presents with    Other     2-3 weeks of feeling different and light headedness tingling in lower face. No dropping noted. About a month ago felt crappy went to sleep still felt bad checked bp 158/120.   Denies cp sob fever chills GI s/s. S/s steady no aggravating or reliving factors.    Had recent read of pacemaker and that all looked fine.  Feels like his heart is racing at night.        
using smart dictation software and may have \"sounds-like\" errors present.     Pt was counseled on risks, benefits and alternatives of treatment options. All questions were asked and answered and the patient was agreeable with the treatment plan as outlined.    Bello Durham MD  [facility]  [phone]

## 2025-03-21 RX ORDER — CARVEDILOL 25 MG/1
12.5 TABLET ORAL 2 TIMES DAILY
Qty: 180 TABLET | Refills: 3 | Status: SHIPPED | OUTPATIENT
Start: 2025-03-21 | End: 2025-03-21

## 2025-03-21 RX ORDER — CARVEDILOL 12.5 MG/1
12.5 TABLET ORAL 2 TIMES DAILY
Qty: 180 TABLET | Refills: 1 | Status: SHIPPED | OUTPATIENT
Start: 2025-03-21

## 2025-03-23 PROCEDURE — 93295 DEV INTERROG REMOTE 1/2/MLT: CPT | Performed by: INTERNAL MEDICINE

## 2025-03-24 ENCOUNTER — TRANSCRIBE ORDERS (OUTPATIENT)
Facility: HOSPITAL | Age: 70
End: 2025-03-24

## 2025-03-24 DIAGNOSIS — R20.2 NUMBNESS AND TINGLING OF RIGHT ARM: Primary | ICD-10-CM

## 2025-03-24 DIAGNOSIS — G45.9 TIA (TRANSIENT ISCHEMIC ATTACK): ICD-10-CM

## 2025-03-24 DIAGNOSIS — R20.0 NUMBNESS AND TINGLING OF RIGHT ARM: Primary | ICD-10-CM

## 2025-03-31 ENCOUNTER — HOSPITAL ENCOUNTER (OUTPATIENT)
Facility: HOSPITAL | Age: 70
Discharge: HOME | End: 2025-04-03
Attending: FAMILY MEDICINE
Payer: MEDICARE

## 2025-03-31 DIAGNOSIS — R20.2 NUMBNESS AND TINGLING OF RIGHT ARM: ICD-10-CM

## 2025-03-31 DIAGNOSIS — R20.0 NUMBNESS AND TINGLING OF RIGHT ARM: ICD-10-CM

## 2025-03-31 DIAGNOSIS — G45.9 TIA (TRANSIENT ISCHEMIC ATTACK): ICD-10-CM

## 2025-03-31 LAB — CREAT BLD-MCNC: 1.7 MG/DL (ref 0.6–1.3)

## 2025-03-31 PROCEDURE — 6360000004 HC RX CONTRAST MEDICATION: Performed by: FAMILY MEDICINE

## 2025-03-31 PROCEDURE — 82565 ASSAY OF CREATININE: CPT

## 2025-03-31 PROCEDURE — 70496 CT ANGIOGRAPHY HEAD: CPT

## 2025-03-31 RX ORDER — IOPAMIDOL 755 MG/ML
100 INJECTION, SOLUTION INTRAVASCULAR
Status: COMPLETED | OUTPATIENT
Start: 2025-03-31 | End: 2025-03-31

## 2025-03-31 RX ADMIN — IOPAMIDOL 100 ML: 755 INJECTION, SOLUTION INTRAVENOUS at 08:18

## 2025-04-03 ENCOUNTER — RESULTS FOLLOW-UP (OUTPATIENT)
Facility: CLINIC | Age: 70
End: 2025-04-03

## 2025-04-03 DIAGNOSIS — I77.79 DISSECTION OF LEFT SUBCLAVIAN ARTERY: Primary | ICD-10-CM

## 2025-05-06 ENCOUNTER — ANCILLARY PROCEDURE (OUTPATIENT)
Age: 70
End: 2025-05-06
Payer: MEDICARE

## 2025-05-06 VITALS — BODY MASS INDEX: 29.53 KG/M2 | WEIGHT: 218 LBS | HEIGHT: 72 IN

## 2025-05-06 DIAGNOSIS — Z95.810 BIVENTRICULAR ICD (IMPLANTABLE CARDIOVERTER-DEFIBRILLATOR) IN PLACE: ICD-10-CM

## 2025-05-06 DIAGNOSIS — I25.10 CORONARY ARTERY DISEASE INVOLVING NATIVE CORONARY ARTERY OF NATIVE HEART WITHOUT ANGINA PECTORIS: ICD-10-CM

## 2025-05-06 DIAGNOSIS — I50.22 CHF (CONGESTIVE HEART FAILURE), NYHA CLASS I, CHRONIC, SYSTOLIC (HCC): ICD-10-CM

## 2025-05-06 DIAGNOSIS — I25.5 ISCHEMIC CARDIOMYOPATHY: ICD-10-CM

## 2025-05-06 PROCEDURE — 93306 TTE W/DOPPLER COMPLETE: CPT | Performed by: INTERNAL MEDICINE

## 2025-05-09 ENCOUNTER — RESULTS FOLLOW-UP (OUTPATIENT)
Age: 70
End: 2025-05-09

## 2025-05-09 LAB
ECHO AO ASC DIAM: 3.2 CM
ECHO AO ASCENDING AORTA INDEX: 1.45 CM/M2
ECHO AO ROOT DIAM: 4 CM
ECHO AO ROOT INDEX: 1.81 CM/M2
ECHO AV AREA PEAK VELOCITY: 4.8 CM2
ECHO AV AREA VTI: 3.9 CM2
ECHO AV AREA/BSA PEAK VELOCITY: 2.2 CM2/M2
ECHO AV AREA/BSA VTI: 1.8 CM2/M2
ECHO AV MEAN GRADIENT: 2 MMHG
ECHO AV MEAN VELOCITY: 0.7 M/S
ECHO AV PEAK GRADIENT: 4 MMHG
ECHO AV PEAK VELOCITY: 1 M/S
ECHO AV VELOCITY RATIO: 0.7
ECHO AV VTI: 19.7 CM
ECHO BSA: 2.24 M2
ECHO EST RA PRESSURE: 3 MMHG
ECHO LA DIAMETER INDEX: 2.04 CM/M2
ECHO LA DIAMETER: 4.5 CM
ECHO LA TO AORTIC ROOT RATIO: 1.13
ECHO LA VOL A-L A2C: 168 ML (ref 18–58)
ECHO LA VOL A-L A4C: 69 ML (ref 18–58)
ECHO LA VOL BP: 111 ML (ref 18–58)
ECHO LA VOL MOD A2C: 163 ML (ref 18–58)
ECHO LA VOL MOD A4C: 68 ML (ref 18–58)
ECHO LA VOL/BSA BIPLANE: 50 ML/M2 (ref 16–34)
ECHO LA VOLUME AREA LENGTH: 114 ML
ECHO LA VOLUME INDEX A-L A2C: 76 ML/M2 (ref 16–34)
ECHO LA VOLUME INDEX A-L A4C: 31 ML/M2 (ref 16–34)
ECHO LA VOLUME INDEX AREA LENGTH: 52 ML/M2 (ref 16–34)
ECHO LA VOLUME INDEX MOD A2C: 74 ML/M2 (ref 16–34)
ECHO LA VOLUME INDEX MOD A4C: 31 ML/M2 (ref 16–34)
ECHO LV E' LATERAL VELOCITY: 3.87 CM/S
ECHO LV E' SEPTAL VELOCITY: 3.94 CM/S
ECHO LV EDV A2C: 357 ML
ECHO LV EDV A4C: 297 ML
ECHO LV EDV BP: 328 ML (ref 67–155)
ECHO LV EDV INDEX A4C: 134 ML/M2
ECHO LV EDV INDEX BP: 148 ML/M2
ECHO LV EDV NDEX A2C: 162 ML/M2
ECHO LV EF PHYSICIAN: 25 %
ECHO LV EJECTION FRACTION A2C: 34 %
ECHO LV EJECTION FRACTION A4C: 19 %
ECHO LV EJECTION FRACTION BIPLANE: 27 % (ref 55–100)
ECHO LV ESV A2C: 237 ML
ECHO LV ESV A4C: 242 ML
ECHO LV ESV BP: 239 ML (ref 22–58)
ECHO LV ESV INDEX A2C: 107 ML/M2
ECHO LV ESV INDEX A4C: 110 ML/M2
ECHO LV ESV INDEX BP: 108 ML/M2
ECHO LV FRACTIONAL SHORTENING: 15 % (ref 28–44)
ECHO LV GLOBAL LONGITUDINAL STRAIN (GLS): -5.4 %
ECHO LV INTERNAL DIMENSION DIASTOLE INDEX: 2.76 CM/M2
ECHO LV INTERNAL DIMENSION DIASTOLIC: 6.1 CM (ref 4.2–5.9)
ECHO LV INTERNAL DIMENSION SYSTOLIC INDEX: 2.35 CM/M2
ECHO LV INTERNAL DIMENSION SYSTOLIC: 5.2 CM
ECHO LV IVSD: 1.4 CM (ref 0.6–1)
ECHO LV MASS 2D: 378.7 G (ref 88–224)
ECHO LV MASS INDEX 2D: 171.4 G/M2 (ref 49–115)
ECHO LV POSTERIOR WALL DIASTOLIC: 1.3 CM (ref 0.6–1)
ECHO LV RELATIVE WALL THICKNESS RATIO: 0.43
ECHO LVOT AREA: 6.2 CM2
ECHO LVOT AV VTI INDEX: 0.62
ECHO LVOT DIAM: 2.8 CM
ECHO LVOT MEAN GRADIENT: 1 MMHG
ECHO LVOT PEAK GRADIENT: 2 MMHG
ECHO LVOT PEAK VELOCITY: 0.7 M/S
ECHO LVOT STROKE VOLUME INDEX: 34.3 ML/M2
ECHO LVOT SV: 75.7 ML
ECHO LVOT VTI: 12.3 CM
ECHO MV A VELOCITY: 0.83 M/S
ECHO MV AREA PHT: 2.5 CM2
ECHO MV E DECELERATION TIME (DT): 298.2 MS
ECHO MV E VELOCITY: 0.51 M/S
ECHO MV E/A RATIO: 0.61
ECHO MV E/E' LATERAL: 13.18
ECHO MV E/E' RATIO (AVERAGED): 13.06
ECHO MV E/E' SEPTAL: 12.94
ECHO MV PRESSURE HALF TIME (PHT): 86.5 MS
ECHO RIGHT VENTRICULAR SYSTOLIC PRESSURE (RVSP): 16 MMHG
ECHO RV INTERNAL DIMENSION: 4.2 CM
ECHO RV TAPSE: 1.6 CM (ref 1.7–?)
ECHO TV REGURGITANT MAX VELOCITY: 1.8 M/S
ECHO TV REGURGITANT PEAK GRADIENT: 13 MMHG

## 2025-05-09 NOTE — RESULT ENCOUNTER NOTE
Hi Mr. Montiel,    Your echo shows your ejection fraction EF is a bit lower than we had seen prior-at 27%.  Your LV is dilated at 6.1 cm.    Overall we continue the goal-directed medical therapy and you are protected with an ICD.    You will discuss more at follow-up with Ana patrick of our nurse practitioners, who we will alternate care with.    Dr. Alejandro

## 2025-05-25 ENCOUNTER — APPOINTMENT (OUTPATIENT)
Facility: HOSPITAL | Age: 70
End: 2025-05-25
Payer: MEDICARE

## 2025-05-25 ENCOUNTER — HOSPITAL ENCOUNTER (EMERGENCY)
Facility: HOSPITAL | Age: 70
Discharge: HOME | End: 2025-05-25
Attending: EMERGENCY MEDICINE
Payer: MEDICARE

## 2025-05-25 VITALS
WEIGHT: 210 LBS | DIASTOLIC BLOOD PRESSURE: 78 MMHG | HEIGHT: 74 IN | RESPIRATION RATE: 13 BRPM | HEART RATE: 51 BPM | SYSTOLIC BLOOD PRESSURE: 124 MMHG | BODY MASS INDEX: 26.95 KG/M2 | TEMPERATURE: 98.7 F | OXYGEN SATURATION: 92 %

## 2025-05-25 DIAGNOSIS — J90 PLEURAL EFFUSION: ICD-10-CM

## 2025-05-25 DIAGNOSIS — R55 SYNCOPE AND COLLAPSE: Primary | ICD-10-CM

## 2025-05-25 LAB
ALBUMIN SERPL-MCNC: 3.4 G/DL (ref 3.5–5)
ALBUMIN/GLOB SERPL: 0.8 (ref 1.1–2.2)
ALP SERPL-CCNC: 84 U/L (ref 45–117)
ALT SERPL-CCNC: 18 U/L (ref 12–78)
ANION GAP SERPL CALC-SCNC: 4 MMOL/L (ref 2–12)
AST SERPL-CCNC: 16 U/L (ref 15–37)
BASOPHILS # BLD: 0.06 K/UL (ref 0–0.1)
BASOPHILS NFR BLD: 1 % (ref 0–1)
BILIRUB SERPL-MCNC: 0.4 MG/DL (ref 0.2–1)
BUN SERPL-MCNC: 17 MG/DL (ref 6–20)
BUN/CREAT SERPL: 11 (ref 12–20)
CALCIUM SERPL-MCNC: 9.4 MG/DL (ref 8.5–10.1)
CHLORIDE SERPL-SCNC: 110 MMOL/L (ref 97–108)
CO2 SERPL-SCNC: 26 MMOL/L (ref 21–32)
COMMENT:: NORMAL
CREAT SERPL-MCNC: 1.49 MG/DL (ref 0.7–1.3)
D DIMER PPP FEU-MCNC: 5.27 MG/L FEU (ref 0–0.65)
DIFFERENTIAL METHOD BLD: ABNORMAL
EOSINOPHIL # BLD: 0.41 K/UL (ref 0–0.4)
EOSINOPHIL NFR BLD: 6.6 % (ref 0–7)
ERYTHROCYTE [DISTWIDTH] IN BLOOD BY AUTOMATED COUNT: 14.6 % (ref 11.5–14.5)
GLOBULIN SER CALC-MCNC: 4.4 G/DL (ref 2–4)
GLUCOSE SERPL-MCNC: 78 MG/DL (ref 65–100)
HCT VFR BLD AUTO: 48 % (ref 36.6–50.3)
HGB BLD-MCNC: 15.8 G/DL (ref 12.1–17)
IMM GRANULOCYTES # BLD AUTO: 0.02 K/UL (ref 0–0.04)
IMM GRANULOCYTES NFR BLD AUTO: 0.3 % (ref 0–0.5)
LYMPHOCYTES # BLD: 1.45 K/UL (ref 0.8–3.5)
LYMPHOCYTES NFR BLD: 23.3 % (ref 12–49)
MCH RBC QN AUTO: 30.4 PG (ref 26–34)
MCHC RBC AUTO-ENTMCNC: 32.9 G/DL (ref 30–36.5)
MCV RBC AUTO: 92.3 FL (ref 80–99)
MONOCYTES # BLD: 0.41 K/UL (ref 0–1)
MONOCYTES NFR BLD: 6.6 % (ref 5–13)
NEUTS SEG # BLD: 3.88 K/UL (ref 1.8–8)
NEUTS SEG NFR BLD: 62.2 % (ref 32–75)
NRBC # BLD: 0 K/UL (ref 0–0.01)
NRBC BLD-RTO: 0 PER 100 WBC
PLATELET # BLD AUTO: 221 K/UL (ref 150–400)
PMV BLD AUTO: 10.8 FL (ref 8.9–12.9)
POTASSIUM SERPL-SCNC: 4.4 MMOL/L (ref 3.5–5.1)
PROT SERPL-MCNC: 7.8 G/DL (ref 6.4–8.2)
RBC # BLD AUTO: 5.2 M/UL (ref 4.1–5.7)
SODIUM SERPL-SCNC: 140 MMOL/L (ref 136–145)
SPECIMEN HOLD: NORMAL
TROPONIN I SERPL HS-MCNC: 55 NG/L (ref 0–76)
TROPONIN I SERPL HS-MCNC: 59 NG/L (ref 0–76)
WBC # BLD AUTO: 6.2 K/UL (ref 4.1–11.1)

## 2025-05-25 PROCEDURE — 36415 COLL VENOUS BLD VENIPUNCTURE: CPT

## 2025-05-25 PROCEDURE — 6370000000 HC RX 637 (ALT 250 FOR IP): Performed by: EMERGENCY MEDICINE

## 2025-05-25 PROCEDURE — 6360000004 HC RX CONTRAST MEDICATION: Performed by: EMERGENCY MEDICINE

## 2025-05-25 PROCEDURE — 80053 COMPREHEN METABOLIC PANEL: CPT

## 2025-05-25 PROCEDURE — 93005 ELECTROCARDIOGRAM TRACING: CPT | Performed by: EMERGENCY MEDICINE

## 2025-05-25 PROCEDURE — 2580000003 HC RX 258: Performed by: EMERGENCY MEDICINE

## 2025-05-25 PROCEDURE — 99223 1ST HOSP IP/OBS HIGH 75: CPT | Performed by: INTERNAL MEDICINE

## 2025-05-25 PROCEDURE — 85025 COMPLETE CBC W/AUTO DIFF WBC: CPT

## 2025-05-25 PROCEDURE — 84484 ASSAY OF TROPONIN QUANT: CPT

## 2025-05-25 PROCEDURE — 71275 CT ANGIOGRAPHY CHEST: CPT

## 2025-05-25 PROCEDURE — 85379 FIBRIN DEGRADATION QUANT: CPT

## 2025-05-25 PROCEDURE — 99285 EMERGENCY DEPT VISIT HI MDM: CPT

## 2025-05-25 PROCEDURE — 71045 X-RAY EXAM CHEST 1 VIEW: CPT

## 2025-05-25 RX ORDER — DOXYCYCLINE HYCLATE 100 MG
100 TABLET ORAL 2 TIMES DAILY
Qty: 20 TABLET | Refills: 0 | Status: SHIPPED | OUTPATIENT
Start: 2025-05-25 | End: 2025-06-04

## 2025-05-25 RX ORDER — 0.9 % SODIUM CHLORIDE 0.9 %
1000 INTRAVENOUS SOLUTION INTRAVENOUS ONCE
Status: COMPLETED | OUTPATIENT
Start: 2025-05-25 | End: 2025-05-25

## 2025-05-25 RX ORDER — DOXYCYCLINE HYCLATE 100 MG
100 TABLET ORAL ONCE
Status: COMPLETED | OUTPATIENT
Start: 2025-05-25 | End: 2025-05-25

## 2025-05-25 RX ORDER — IOPAMIDOL 755 MG/ML
100 INJECTION, SOLUTION INTRAVASCULAR
Status: COMPLETED | OUTPATIENT
Start: 2025-05-25 | End: 2025-05-25

## 2025-05-25 RX ADMIN — SODIUM CHLORIDE 1000 ML: 0.9 INJECTION, SOLUTION INTRAVENOUS at 19:54

## 2025-05-25 RX ADMIN — IOPAMIDOL 100 ML: 755 INJECTION, SOLUTION INTRAVENOUS at 19:47

## 2025-05-25 RX ADMIN — DOXYCYCLINE HYCLATE 100 MG: 100 TABLET, COATED ORAL at 21:33

## 2025-05-25 ASSESSMENT — PAIN - FUNCTIONAL ASSESSMENT: PAIN_FUNCTIONAL_ASSESSMENT: 0-10

## 2025-05-25 ASSESSMENT — PAIN SCALES - GENERAL: PAINLEVEL_OUTOF10: 0

## 2025-05-25 NOTE — CONSULTS
[facility] CARDIOLOGY                    Cardiology Hospital Care Note     [x]Initial Encounter     []Follow-up    Patient Name: oTni Montiel - :1955 - MRN:[id number]  Primary Cardiologist: Alina Alejandro MD  Consulting Cardiologist: Benedict Kingston MD     Reason for encounter: syncope    HPI:       Toni Montiel is a 69 y.o. male with PMH significant for biventricular ICD Tracey, presented to ER with syncope about 4 pm at home  He was in kitchen and fell flat but son got him and did not have injury  He woke up right away  He felt sudden dizziness  He has had headache and dizziness but never had syncope  He did not have cell phone or electrical device near BIV ICD  [employer] rep downloaded MERLIN check off his BIV ICD first and so Dr Page in ER asked me to see him  It showed today about 2 pm he had ventricular oversensing and safety pacing attempt when there is cross talk after atrial pacing  His sinus rate is 55 bpm and he was RA pacing with CRT at rate 60 bpm  He had RV Riata ICD lead   He is dependent so RA and LV lead added in   Gen change in      He has had other ventricular oversensing before but not many and did not have syncope  2025 PCP thought he may have TIA, head CT was done as below  Thought he was orthostatic     He had VT ablation before in   No VT tonight     Assessment and Plan     Syncope likely from ventricular oversensing after RA pacing and cross talk, ventricular safety pacing occured.  He had 95% V pacing and 59% RA pacing.  There are 2 hr different between device clock and his house clock.  It is not totally clear if he had ventricular oversensing at 2 pm and syncope at 4 pm.  He and his son could not recall the exact time  When I changed lower rate to 50 bpm he was atrial sensing 55 bpm  There is no more ventricular oversensing   I also removed rate response for now to avoid RA pacing  He can track with BIV pacing  His device has 6-7 year battery left  His

## 2025-05-25 NOTE — ED PROVIDER NOTES
[facility] EMERGENCY DEPARTMENT  EMERGENCY DEPARTMENT ENCOUNTER      Pt Name: Toni Montiel  MRN: [id number]  Birthdate 1955  Date of evaluation: 5/25/2025  Provider: Chari Page DO    CHIEF COMPLAINT       Chief Complaint   Patient presents with    Syncope         HISTORY OF PRESENT ILLNESS   (Location/Symptom, Timing/Onset, Context/Setting, Quality, Duration, Modifying Factors, Severity)  Note limiting factors.   HPI      Review of External Medical Records:     Nursing Notes were reviewed.    REVIEW OF SYSTEMS    (2-9 systems for level 4, 10 or more for level 5)     Review of Systems    Except as noted above the remainder of the review of systems was reviewed and negative.       PAST MEDICAL HISTORY     Past Medical History:   Diagnosis Date    CAD (coronary artery disease)     Carotid artery disease     CHF (congestive heart failure), NYHA class I, chronic, systolic ([facility])     CKD (chronic kidney disease) stage 3, GFR 30-59 ml/min ([facility])     Erectile dysfunction     Hx of blood clots     Hx of deep venous thrombosis     travel provoked    Hx pulmonary embolism     travel provoked    ICD (implantable cardioverter-defibrillator) in place     Panic attacks 08/03/2023    Tobacco abuse          SURGICAL HISTORY       Past Surgical History:   Procedure Laterality Date    ABLATION OF DYSRHYTHMIC FOCUS  2021    CARDIAC CATHETERIZATION      COLONOSCOPY N/A 12/3/2024    COLONOSCOPY DIAGNOSTIC performed by Caleb Costa MD at [facility] ENDOSCOPY    COLONOSCOPY  12/3/2024    COLONOSCOPY POLYPECTOMY REMOVAL SNARE/STOMA performed by Caleb Costa MD at [facility] ENDOSCOPY    COLONOSCOPY  12/3/2024    COLONOSCOPY CONTROL HEMORRHAGE performed by Caleb Costa MD at [facility] ENDOSCOPY    CORONARY ANGIOPLASTY WITH STENT PLACEMENT      CORONARY ARTERY BYPASS GRAFT      EP DEVICE PROCEDURE N/A 04/01/2024    Remove & replace ICD biv multi leads performed by Radha Quinones MD at [facility] CARDIAC CATH LAB

## 2025-05-25 NOTE — ED TRIAGE NOTES
Pt arrives to the ER for complaints of syncope. Reports that he woke up this morning and states that he was having dizziness upon standing.     Pt reports that at one moment, he was bending down and fully passed out. States that his son witnessed the episode and was able to catch him.   Denies hitting head. Denies taking blood thinners.     Denies any chest pain or shortness of breath.     Reports that he has an extensive cardiac hx with stent placement and triple bypass in 2000. Pt does have a cardiac pacemaker/defib placed.

## 2025-05-27 LAB
EKG ATRIAL RATE: 60 BPM
EKG DIAGNOSIS: NORMAL
EKG P AXIS: 0 DEGREES
EKG P-R INTERVAL: 222 MS
EKG Q-T INTERVAL: 490 MS
EKG QRS DURATION: 164 MS
EKG QTC CALCULATION (BAZETT): 490 MS
EKG R AXIS: -86 DEGREES
EKG T AXIS: 132 DEGREES
EKG VENTRICULAR RATE: 60 BPM

## 2025-05-27 PROCEDURE — 93010 ELECTROCARDIOGRAM REPORT: CPT | Performed by: INTERNAL MEDICINE

## 2025-05-28 ENCOUNTER — OFFICE VISIT (OUTPATIENT)
Facility: CLINIC | Age: 70
End: 2025-05-28
Payer: MEDICARE

## 2025-05-28 VITALS
WEIGHT: 210 LBS | BODY MASS INDEX: 26.95 KG/M2 | DIASTOLIC BLOOD PRESSURE: 82 MMHG | SYSTOLIC BLOOD PRESSURE: 118 MMHG | HEART RATE: 63 BPM | OXYGEN SATURATION: 99 % | HEIGHT: 74 IN

## 2025-05-28 DIAGNOSIS — F41.1 GENERALIZED ANXIETY DISORDER WITH PANIC ATTACKS: ICD-10-CM

## 2025-05-28 DIAGNOSIS — F41.0 GENERALIZED ANXIETY DISORDER WITH PANIC ATTACKS: ICD-10-CM

## 2025-05-28 DIAGNOSIS — J90 PLEURAL EFFUSION, RIGHT: ICD-10-CM

## 2025-05-28 DIAGNOSIS — R55 SYNCOPE, UNSPECIFIED SYNCOPE TYPE: Primary | ICD-10-CM

## 2025-05-28 DIAGNOSIS — R73.03 PREDIABETES: ICD-10-CM

## 2025-05-28 PROCEDURE — 4004F PT TOBACCO SCREEN RCVD TLK: CPT

## 2025-05-28 PROCEDURE — G8427 DOCREV CUR MEDS BY ELIG CLIN: HCPCS

## 2025-05-28 PROCEDURE — G8417 CALC BMI ABV UP PARAM F/U: HCPCS

## 2025-05-28 PROCEDURE — 1159F MED LIST DOCD IN RCRD: CPT

## 2025-05-28 PROCEDURE — 99214 OFFICE O/P EST MOD 30 MIN: CPT

## 2025-05-28 PROCEDURE — 1126F AMNT PAIN NOTED NONE PRSNT: CPT

## 2025-05-28 PROCEDURE — 1123F ACP DISCUSS/DSCN MKR DOCD: CPT

## 2025-05-28 PROCEDURE — 3017F COLORECTAL CA SCREEN DOC REV: CPT

## 2025-05-28 NOTE — PROGRESS NOTES
Chief Complaint   Patient presents with    Follow-Up from Hospital       Subjective: Patient presented today for a follow-up after being in the hospital.      \"Have you been to the ER, urgent care clinic since your last visit?  Hospitalized since your last visit?\"    YES - When: approximately 4 days ago.  Where and Why: [facility] ED, syncope .    “Have you seen or consulted any other health care providers outside of [facility] since your last visit?”    NO          Click Here for Release of Records Request  
content normal.         Judgment: Judgment normal.           ASSESSMENT & PLAN    Assessment & Plan  Syncope, unspecified syncope type   New, uncertain prognosis,  The patient's syncope episodes are likely cardiogenic in origin. Per EP note in the ED \"ventricular oversensing after RA pacing and cross talk, ventricular safety pacing occurred\" related to his pacemaker, which requires adjustment. His blood work is within normal limits, except for an elevated D-dimer, but Cta was negative for PE and CXR showed a known right sided effusion.He has been advised against driving for a period of 6 months due to his syncope episodes. He has been instructed to inform us if he experiences any issues in the interim. And to follow up with EP as scheduled on 6/9/25.         Generalized anxiety disorder with panic attacks   Chronic, not at goal (unstable), due to anxiety related to his pacemaker check coming up. He is currently taking Paxil 20 mg for anxiety. Hydroxyzine has been discussed as an additional option for managing panic attacks, with the understanding that it may cause drowsiness. He will monitor his symptoms and inform us if he requires further assistance.         Prediabetes   Chronic, at goal (stable), continue current treatment plan. His last A1c was 6.2, indicating diet-controlled diabetes. He has lost about 15 pounds and has changed his diet. He is scheduled for lab work at the end of June, which will include a comprehensive panel, liver function tests, kidney function tests, A1c, and cholesterol levels.         Pleural effusion, right   Chronic, at goal (stable), right effusion seen on imaging in the Ed. Patient was prescribed doxycycline in case of pneumonia. Patient reports he is asymptomatic when it comes to pneumonia symptoms and have a known right effusion for years now that PCP is aware of. On review of records, pleural effusion small on right side has been on imaging since 2023. Will continue to monitor.

## 2025-05-28 NOTE — ASSESSMENT & PLAN NOTE
Chronic, not at goal (unstable), due to anxiety related to his pacemaker check coming up. He is currently taking Paxil 20 mg for anxiety. Hydroxyzine has been discussed as an additional option for managing panic attacks, with the understanding that it may cause drowsiness. He will monitor his symptoms and inform us if he requires further assistance.

## 2025-05-28 NOTE — ASSESSMENT & PLAN NOTE
Chronic, at goal (stable), continue current treatment plan. His last A1c was 6.2, indicating diet-controlled diabetes. He has lost about 15 pounds and has changed his diet. He is scheduled for lab work at the end of June, which will include a comprehensive panel, liver function tests, kidney function tests, A1c, and cholesterol levels.

## 2025-06-09 ENCOUNTER — PROCEDURE VISIT (OUTPATIENT)
Age: 70
End: 2025-06-09
Payer: MEDICARE

## 2025-06-09 DIAGNOSIS — Z95.810 BIVENTRICULAR ICD (IMPLANTABLE CARDIOVERTER-DEFIBRILLATOR) IN PLACE: Primary | ICD-10-CM

## 2025-06-09 PROCEDURE — 93289 INTERROG DEVICE EVAL HEART: CPT | Performed by: INTERNAL MEDICINE

## 2025-06-13 ENCOUNTER — TELEPHONE (OUTPATIENT)
Age: 70
End: 2025-06-13

## 2025-06-13 NOTE — TELEPHONE ENCOUNTER
Placed a call to patient. ID verified using two patient identifiers. Patient has been advised of the previous communication by Dr. Quinones. Patient agreed to appointment.     [employer] rep has been booked. Spoke to rep Antoinette.     Opportunities for questions, clarifications, and concerns provided.  Pt expressed understanding.     Future Appointments   Date Time Provider Department Center   6/27/2025  8:00 AM PACEMAKER, [facility]FRANCES [facility] BS AMB   6/27/2025  8:00 AM Radha Quinones MD [facility] BS AMB   8/1/2025  8:00 AM Bello Durham MD [facility]   1/16/2026  8:40 AM PACEMAKER, STFRANCES [facility] BS AMB   1/16/2026  9:00 AM Chari Underwood APRN - HUGO [facility] BS AMB       
Bello Durham MD         [facility]  1/16/2026  8:40 AM    PACEMAKER, STFRANCES       [facility]               BS AMB  1/16/2026  9:00 AM    Chari Underwood APRN - NP [facility]               BS AMB  ----- Message -----  From: Benedict Kingston MD  Sent: 5/25/2025   7:50 PM EDT  To: Radha Quinones MD; Alina Alejandro MD; #    In ER tonight with 1-2 sec syncope  Syncope could be from ventricular oversensing after RA pacing and cross talk   He is dependent  RIATA ICD lead 2008 but no other noise  Hx of VT but none today  When I changed lower rate to 50 bpm he was atrial sensing  There is no more oversensing   I also removed rate response for now to avoid RA pacing  He can track with BIV pacing  His device has 6-7 year battery left  His leads are old but there is no other sign of lead fracture   He did not want to stay overnight for observation   In March he was dizzy and there was oversensing then too  Head CT was done by PCP  Will continue to monitor remotely and in person  He is ok without driving and wants to have in person check again in 2 weeks or so    Future Appointments  5/28/2025  9:00 AM    Ana Sotomayor ACNP      [facility]              BS AMB  8/1/2025   8:00 AM    Bello Durham MD         [facility] DEP  1/16/2026  8:40 AM    PACEMAKER, STFRANCES       [facility]               BS AMB  1/16/2026  9:00 AM    Chari Underwood APRN - NP [facility]               BS AMB

## 2025-06-26 PROBLEM — R55 SYNCOPE AND COLLAPSE: Status: ACTIVE | Noted: 2025-06-26

## 2025-06-27 ENCOUNTER — PREP FOR PROCEDURE (OUTPATIENT)
Age: 70
End: 2025-06-27

## 2025-06-27 ENCOUNTER — PROCEDURE VISIT (OUTPATIENT)
Age: 70
End: 2025-06-27
Payer: MEDICARE

## 2025-06-27 ENCOUNTER — TELEPHONE (OUTPATIENT)
Age: 70
End: 2025-06-27

## 2025-06-27 ENCOUNTER — OFFICE VISIT (OUTPATIENT)
Age: 70
End: 2025-06-27
Payer: MEDICARE

## 2025-06-27 VITALS
BODY MASS INDEX: 26.82 KG/M2 | OXYGEN SATURATION: 96 % | DIASTOLIC BLOOD PRESSURE: 64 MMHG | HEART RATE: 60 BPM | SYSTOLIC BLOOD PRESSURE: 96 MMHG | RESPIRATION RATE: 16 BRPM | WEIGHT: 209 LBS | HEIGHT: 74 IN

## 2025-06-27 DIAGNOSIS — I25.10 CORONARY ARTERY DISEASE INVOLVING NATIVE HEART WITHOUT ANGINA PECTORIS, UNSPECIFIED VESSEL OR LESION TYPE: ICD-10-CM

## 2025-06-27 DIAGNOSIS — I25.5 ISCHEMIC CARDIOMYOPATHY: ICD-10-CM

## 2025-06-27 DIAGNOSIS — Z95.810 BIVENTRICULAR ICD (IMPLANTABLE CARDIOVERTER-DEFIBRILLATOR) IN PLACE: ICD-10-CM

## 2025-06-27 DIAGNOSIS — T82.110A FAILURE OF PACEMAKER LEAD, INITIAL ENCOUNTER: Primary | ICD-10-CM

## 2025-06-27 DIAGNOSIS — R55 SYNCOPE AND COLLAPSE: ICD-10-CM

## 2025-06-27 DIAGNOSIS — N18.30 STAGE 3 CHRONIC KIDNEY DISEASE, UNSPECIFIED WHETHER STAGE 3A OR 3B CKD (HCC): ICD-10-CM

## 2025-06-27 DIAGNOSIS — Z95.810 BIVENTRICULAR ICD (IMPLANTABLE CARDIOVERTER-DEFIBRILLATOR) IN PLACE: Primary | ICD-10-CM

## 2025-06-27 DIAGNOSIS — I47.20 VENTRICULAR TACHYCARDIA (HCC): ICD-10-CM

## 2025-06-27 DIAGNOSIS — I50.22 CHRONIC SYSTOLIC HEART FAILURE (HCC): ICD-10-CM

## 2025-06-27 DIAGNOSIS — Z95.1 HX OF CABG: ICD-10-CM

## 2025-06-27 DIAGNOSIS — R55 SYNCOPE AND COLLAPSE: Primary | ICD-10-CM

## 2025-06-27 LAB
ANION GAP SERPL CALC-SCNC: 3 MMOL/L (ref 2–12)
BUN SERPL-MCNC: 23 MG/DL (ref 6–20)
BUN/CREAT SERPL: 14 (ref 12–20)
CALCIUM SERPL-MCNC: 9.4 MG/DL (ref 8.5–10.1)
CHLORIDE SERPL-SCNC: 109 MMOL/L (ref 97–108)
CO2 SERPL-SCNC: 28 MMOL/L (ref 21–32)
CREAT SERPL-MCNC: 1.63 MG/DL (ref 0.7–1.3)
ERYTHROCYTE [DISTWIDTH] IN BLOOD BY AUTOMATED COUNT: 14.6 % (ref 11.5–14.5)
GLUCOSE SERPL-MCNC: 108 MG/DL (ref 65–100)
HCT VFR BLD AUTO: 47.8 % (ref 36.6–50.3)
HGB BLD-MCNC: 15.5 G/DL (ref 12.1–17)
MCH RBC QN AUTO: 30.3 PG (ref 26–34)
MCHC RBC AUTO-ENTMCNC: 32.4 G/DL (ref 30–36.5)
MCV RBC AUTO: 93.4 FL (ref 80–99)
NRBC # BLD: 0 K/UL (ref 0–0.01)
NRBC BLD-RTO: 0 PER 100 WBC
PLATELET # BLD AUTO: 174 K/UL (ref 150–400)
PMV BLD AUTO: 11.5 FL (ref 8.9–12.9)
POTASSIUM SERPL-SCNC: 4.5 MMOL/L (ref 3.5–5.1)
RBC # BLD AUTO: 5.12 M/UL (ref 4.1–5.7)
SODIUM SERPL-SCNC: 140 MMOL/L (ref 136–145)
WBC # BLD AUTO: 9.7 K/UL (ref 4.1–11.1)

## 2025-06-27 PROCEDURE — 1126F AMNT PAIN NOTED NONE PRSNT: CPT | Performed by: INTERNAL MEDICINE

## 2025-06-27 PROCEDURE — G8427 DOCREV CUR MEDS BY ELIG CLIN: HCPCS | Performed by: INTERNAL MEDICINE

## 2025-06-27 PROCEDURE — 1160F RVW MEDS BY RX/DR IN RCRD: CPT | Performed by: INTERNAL MEDICINE

## 2025-06-27 PROCEDURE — 1159F MED LIST DOCD IN RCRD: CPT | Performed by: INTERNAL MEDICINE

## 2025-06-27 PROCEDURE — 3017F COLORECTAL CA SCREEN DOC REV: CPT | Performed by: INTERNAL MEDICINE

## 2025-06-27 PROCEDURE — 1123F ACP DISCUSS/DSCN MKR DOCD: CPT | Performed by: INTERNAL MEDICINE

## 2025-06-27 PROCEDURE — 99214 OFFICE O/P EST MOD 30 MIN: CPT | Performed by: INTERNAL MEDICINE

## 2025-06-27 PROCEDURE — G8417 CALC BMI ABV UP PARAM F/U: HCPCS | Performed by: INTERNAL MEDICINE

## 2025-06-27 PROCEDURE — 93289 INTERROG DEVICE EVAL HEART: CPT | Performed by: INTERNAL MEDICINE

## 2025-06-27 PROCEDURE — 4004F PT TOBACCO SCREEN RCVD TLK: CPT | Performed by: INTERNAL MEDICINE

## 2025-06-27 PROCEDURE — G2211 COMPLEX E/M VISIT ADD ON: HCPCS | Performed by: INTERNAL MEDICINE

## 2025-06-27 RX ORDER — CHLORHEXIDINE GLUCONATE 40 MG/ML
SOLUTION TOPICAL NIGHTLY
Qty: 1 EACH | Refills: 0 | Status: SHIPPED | OUTPATIENT
Start: 2025-06-27

## 2025-06-27 NOTE — PATIENT INSTRUCTIONS
You are scheduled for the following procedure with Dr. Quinones:  New Implant Single Chamber V Lead and Venogram at [facility], [address], [place] [address]      PLEASE be aware that your procedure date/time is tentative and subject to change due to emergency cases.    Any changes to your procedure date/time will be communicated by the hospital staff.      Procedure date/time:    Wednesday, July 2, 2025 at 3:30 pm - please arrive by 1:30 pm      ARRIVAL time:  (You will need someone to drive you home.)     [X]  [facility] procedures: arrive to check in on 1st floor near [facility] entrance two hours prior to your procedure.      Pre-procedure Labs/Imaging:    PRE-PROCEDURE LABS NEEDED: Yes - please have labs done on 6/27/25   Forms for labwork may be given at appointment. If not, they will be mailed to you.  These can be done at any [facility] or [facility] lab.      [facility]  [address], [address]  [place], [place] [address]  Monday-Friday 730A-430P (closed 1230-130P)  [phone]    [address]   [place] [address]  Monday-Friday 8:00AM - 5:00 PM   [phone]    [facility]  [address]  [place], [place] [address]  Monday-Friday 7A-5P  [phone]    [facility]  [address], [address]  [place], [place] [address]  Monday-Friday 730A-430P  [phone] (closed 1-2P)    [facility]  [address] , [address]  [place], [place] [address]  Monday-Friday 730A-430P (closed 1230-130P)  [phone]    [facility]  [address] [place], [place] [address]  Monday-Friday 7A-430P  [phone]    [facility]  [address]  [place], [place] [address]  Monday-Friday 730A-430P (closed 1-2P)  [phone]          Medication Instructions:    Take medications as

## 2025-06-27 NOTE — PROGRESS NOTES
Room #: 3    Chief Complaint   Patient presents with    Follow-up    Device Check       Vitals:    06/27/25 0803   BP: 96/64   BP Site: Left Upper Arm   Patient Position: Sitting   BP Cuff Size: Large Adult   Pulse: 60   Resp: 16   SpO2: 96%   Weight: 94.8 kg (209 lb)   Height: 1.88 m (6' 2\")         Chest pain:  NO    Have you been to the ER, urgent care, or hospitalized outside of [facility] since your last visit?   NO    Refills:  NO  
Results   Component Value Date/Time    BUN 17 05/25/2025 05:04 PM     Lab Results   Component Value Date/Time    K 4.4 05/25/2025 05:04 PM     No results found for: \"HBA1C\"  Lab Results   Component Value Date/Time    HGB 15.8 05/25/2025 05:04 PM     Lab Results   Component Value Date/Time     05/25/2025 05:04 PM                  ___________________________________________________    An Jeison Quinones MD    Cardiac Electrophysiology  [facility]   [address]  [place] [address] [phone]

## 2025-07-01 ENCOUNTER — ANESTHESIA EVENT (OUTPATIENT)
Facility: HOSPITAL | Age: 70
End: 2025-07-01
Payer: MEDICARE

## 2025-07-01 RX ORDER — SODIUM CHLORIDE 0.9 % (FLUSH) 0.9 %
5-40 SYRINGE (ML) INJECTION PRN
Status: CANCELLED | OUTPATIENT
Start: 2025-07-01

## 2025-07-01 RX ORDER — SODIUM CHLORIDE 0.9 % (FLUSH) 0.9 %
5-40 SYRINGE (ML) INJECTION EVERY 12 HOURS SCHEDULED
Status: CANCELLED | OUTPATIENT
Start: 2025-07-01

## 2025-07-01 RX ORDER — SODIUM CHLORIDE 9 MG/ML
INJECTION, SOLUTION INTRAVENOUS PRN
Status: CANCELLED | OUTPATIENT
Start: 2025-07-01

## 2025-07-02 ENCOUNTER — HOSPITAL ENCOUNTER (OUTPATIENT)
Facility: HOSPITAL | Age: 70
Discharge: HOME | End: 2025-07-02
Attending: INTERNAL MEDICINE | Admitting: INTERNAL MEDICINE
Payer: MEDICARE

## 2025-07-02 ENCOUNTER — APPOINTMENT (OUTPATIENT)
Facility: HOSPITAL | Age: 70
End: 2025-07-02
Attending: INTERNAL MEDICINE
Payer: MEDICARE

## 2025-07-02 ENCOUNTER — ANESTHESIA (OUTPATIENT)
Facility: HOSPITAL | Age: 70
End: 2025-07-02
Payer: MEDICARE

## 2025-07-02 VITALS
DIASTOLIC BLOOD PRESSURE: 55 MMHG | HEIGHT: 74 IN | WEIGHT: 206 LBS | RESPIRATION RATE: 20 BRPM | BODY MASS INDEX: 26.44 KG/M2 | OXYGEN SATURATION: 88 % | HEART RATE: 65 BPM | SYSTOLIC BLOOD PRESSURE: 100 MMHG | TEMPERATURE: 97.6 F

## 2025-07-02 DIAGNOSIS — T82.110A PACEMAKER LEAD FAILURE: ICD-10-CM

## 2025-07-02 LAB
ECHO BSA: 2.21 M2
ECHO BSA: 2.21 M2

## 2025-07-02 PROCEDURE — C1894 INTRO/SHEATH, NON-LASER: HCPCS | Performed by: INTERNAL MEDICINE

## 2025-07-02 PROCEDURE — 2580000003 HC RX 258: Performed by: NURSE ANESTHETIST, CERTIFIED REGISTERED

## 2025-07-02 PROCEDURE — C1769 GUIDE WIRE: HCPCS | Performed by: INTERNAL MEDICINE

## 2025-07-02 PROCEDURE — 71045 X-RAY EXAM CHEST 1 VIEW: CPT

## 2025-07-02 PROCEDURE — C1882 AICD, OTHER THAN SING/DUAL: HCPCS | Performed by: INTERNAL MEDICINE

## 2025-07-02 PROCEDURE — 6360000002 HC RX W HCPCS: Performed by: INTERNAL MEDICINE

## 2025-07-02 PROCEDURE — 33249 INSJ/RPLCMT DEFIB W/LEAD(S): CPT | Performed by: INTERNAL MEDICINE

## 2025-07-02 PROCEDURE — 2500000003 HC RX 250 WO HCPCS: Performed by: INTERNAL MEDICINE

## 2025-07-02 PROCEDURE — 6360000002 HC RX W HCPCS: Performed by: NURSE ANESTHETIST, CERTIFIED REGISTERED

## 2025-07-02 PROCEDURE — 2580000003 HC RX 258: Performed by: ANESTHESIOLOGY

## 2025-07-02 PROCEDURE — 76937 US GUIDE VASCULAR ACCESS: CPT | Performed by: INTERNAL MEDICINE

## 2025-07-02 PROCEDURE — 3700000000 HC ANESTHESIA ATTENDED CARE: Performed by: INTERNAL MEDICINE

## 2025-07-02 PROCEDURE — C1777 LEAD, AICD, ENDO SINGLE COIL: HCPCS | Performed by: INTERNAL MEDICINE

## 2025-07-02 PROCEDURE — 2720000010 HC SURG SUPPLY STERILE: Performed by: INTERNAL MEDICINE

## 2025-07-02 PROCEDURE — C1892 INTRO/SHEATH,FIXED,PEEL-AWAY: HCPCS | Performed by: INTERNAL MEDICINE

## 2025-07-02 PROCEDURE — 3700000001 HC ADD 15 MINUTES (ANESTHESIA): Performed by: INTERNAL MEDICINE

## 2025-07-02 PROCEDURE — 2709999900 HC NON-CHARGEABLE SUPPLY: Performed by: INTERNAL MEDICINE

## 2025-07-02 DEVICE — DEFIBRILLATION LEAD
Type: IMPLANTABLE DEVICE | Status: FUNCTIONAL
Brand: DURATA™

## 2025-07-02 DEVICE — HF CARDIAC RESYNCHRONISATION THERAPY DEFIBRILLATOR VVED DDDRV
Type: IMPLANTABLE DEVICE | Status: FUNCTIONAL
Brand: GALLANT™

## 2025-07-02 RX ORDER — PROCHLORPERAZINE EDISYLATE 5 MG/ML
5 INJECTION INTRAMUSCULAR; INTRAVENOUS
Status: DISCONTINUED | OUTPATIENT
Start: 2025-07-02 | End: 2025-07-02 | Stop reason: HOSPADM

## 2025-07-02 RX ORDER — SODIUM CHLORIDE 0.9 % (FLUSH) 0.9 %
5-40 SYRINGE (ML) INJECTION PRN
Status: DISCONTINUED | OUTPATIENT
Start: 2025-07-02 | End: 2025-07-02 | Stop reason: HOSPADM

## 2025-07-02 RX ORDER — CARVEDILOL 3.12 MG/1
12.5 TABLET ORAL 2 TIMES DAILY
Status: DISCONTINUED | OUTPATIENT
Start: 2025-07-02 | End: 2025-07-02 | Stop reason: HOSPADM

## 2025-07-02 RX ORDER — NALOXONE HYDROCHLORIDE 0.4 MG/ML
INJECTION, SOLUTION INTRAMUSCULAR; INTRAVENOUS; SUBCUTANEOUS PRN
Status: DISCONTINUED | OUTPATIENT
Start: 2025-07-02 | End: 2025-07-02 | Stop reason: HOSPADM

## 2025-07-02 RX ORDER — ACETAMINOPHEN 500 MG
1000 TABLET ORAL ONCE
Status: DISCONTINUED | OUTPATIENT
Start: 2025-07-02 | End: 2025-07-02 | Stop reason: HOSPADM

## 2025-07-02 RX ORDER — HYDROMORPHONE HYDROCHLORIDE 1 MG/ML
0.5 INJECTION, SOLUTION INTRAMUSCULAR; INTRAVENOUS; SUBCUTANEOUS EVERY 5 MIN PRN
Status: DISCONTINUED | OUTPATIENT
Start: 2025-07-02 | End: 2025-07-02 | Stop reason: HOSPADM

## 2025-07-02 RX ORDER — ROSUVASTATIN CALCIUM 40 MG/1
40 TABLET, COATED ORAL NIGHTLY
Status: DISCONTINUED | OUTPATIENT
Start: 2025-07-02 | End: 2025-07-02 | Stop reason: HOSPADM

## 2025-07-02 RX ORDER — PAROXETINE 20 MG/1
20 TABLET, FILM COATED ORAL DAILY
Status: DISCONTINUED | OUTPATIENT
Start: 2025-07-03 | End: 2025-07-02 | Stop reason: HOSPADM

## 2025-07-02 RX ORDER — ASPIRIN 81 MG/1
81 TABLET, CHEWABLE ORAL DAILY
Status: DISCONTINUED | OUTPATIENT
Start: 2025-07-03 | End: 2025-07-02 | Stop reason: HOSPADM

## 2025-07-02 RX ORDER — PROPOFOL 10 MG/ML
INJECTION, EMULSION INTRAVENOUS
Status: DISCONTINUED | OUTPATIENT
Start: 2025-07-02 | End: 2025-07-02 | Stop reason: SDUPTHER

## 2025-07-02 RX ORDER — SODIUM CHLORIDE 9 MG/ML
INJECTION, SOLUTION INTRAVENOUS PRN
Status: DISCONTINUED | OUTPATIENT
Start: 2025-07-02 | End: 2025-07-02 | Stop reason: HOSPADM

## 2025-07-02 RX ORDER — FENTANYL CITRATE 50 UG/ML
100 INJECTION, SOLUTION INTRAMUSCULAR; INTRAVENOUS
Status: DISCONTINUED | OUTPATIENT
Start: 2025-07-02 | End: 2025-07-02 | Stop reason: HOSPADM

## 2025-07-02 RX ORDER — OXYCODONE HYDROCHLORIDE 5 MG/1
5 TABLET ORAL
Status: DISCONTINUED | OUTPATIENT
Start: 2025-07-02 | End: 2025-07-02 | Stop reason: HOSPADM

## 2025-07-02 RX ORDER — SODIUM CHLORIDE, SODIUM LACTATE, POTASSIUM CHLORIDE, CALCIUM CHLORIDE 600; 310; 30; 20 MG/100ML; MG/100ML; MG/100ML; MG/100ML
INJECTION, SOLUTION INTRAVENOUS CONTINUOUS
Status: DISCONTINUED | OUTPATIENT
Start: 2025-07-02 | End: 2025-07-02 | Stop reason: HOSPADM

## 2025-07-02 RX ORDER — SODIUM CHLORIDE 0.9 % (FLUSH) 0.9 %
5-40 SYRINGE (ML) INJECTION EVERY 12 HOURS SCHEDULED
Status: DISCONTINUED | OUTPATIENT
Start: 2025-07-02 | End: 2025-07-02 | Stop reason: HOSPADM

## 2025-07-02 RX ORDER — LIDOCAINE HYDROCHLORIDE 10 MG/ML
1 INJECTION, SOLUTION EPIDURAL; INFILTRATION; INTRACAUDAL; PERINEURAL
Status: DISCONTINUED | OUTPATIENT
Start: 2025-07-02 | End: 2025-07-02 | Stop reason: HOSPADM

## 2025-07-02 RX ORDER — HYDRALAZINE HYDROCHLORIDE 20 MG/ML
10 INJECTION INTRAMUSCULAR; INTRAVENOUS ONCE
Status: DISCONTINUED | OUTPATIENT
Start: 2025-07-02 | End: 2025-07-02 | Stop reason: HOSPADM

## 2025-07-02 RX ORDER — ONDANSETRON 2 MG/ML
INJECTION INTRAMUSCULAR; INTRAVENOUS
Status: DISCONTINUED | OUTPATIENT
Start: 2025-07-02 | End: 2025-07-02 | Stop reason: SDUPTHER

## 2025-07-02 RX ORDER — FENTANYL CITRATE 50 UG/ML
25 INJECTION, SOLUTION INTRAMUSCULAR; INTRAVENOUS EVERY 5 MIN PRN
Status: DISCONTINUED | OUTPATIENT
Start: 2025-07-02 | End: 2025-07-02 | Stop reason: HOSPADM

## 2025-07-02 RX ORDER — FENTANYL CITRATE 50 UG/ML
INJECTION, SOLUTION INTRAMUSCULAR; INTRAVENOUS
Status: DISCONTINUED | OUTPATIENT
Start: 2025-07-02 | End: 2025-07-02 | Stop reason: SDUPTHER

## 2025-07-02 RX ORDER — ONDANSETRON 2 MG/ML
4 INJECTION INTRAMUSCULAR; INTRAVENOUS
Status: DISCONTINUED | OUTPATIENT
Start: 2025-07-02 | End: 2025-07-02 | Stop reason: HOSPADM

## 2025-07-02 RX ORDER — CEFAZOLIN SODIUM 1 G/3ML
INJECTION, POWDER, FOR SOLUTION INTRAMUSCULAR; INTRAVENOUS
Status: DISCONTINUED | OUTPATIENT
Start: 2025-07-02 | End: 2025-07-02 | Stop reason: SDUPTHER

## 2025-07-02 RX ORDER — MIDAZOLAM HYDROCHLORIDE 2 MG/2ML
2 INJECTION, SOLUTION INTRAMUSCULAR; INTRAVENOUS PRN
Status: DISCONTINUED | OUTPATIENT
Start: 2025-07-02 | End: 2025-07-02 | Stop reason: HOSPADM

## 2025-07-02 RX ADMIN — PROPOFOL 20 MG: 10 INJECTION, EMULSION INTRAVENOUS at 15:54

## 2025-07-02 RX ADMIN — ONDANSETRON 4 MG: 2 INJECTION, SOLUTION INTRAMUSCULAR; INTRAVENOUS at 15:48

## 2025-07-02 RX ADMIN — PROPOFOL 50 MG: 10 INJECTION, EMULSION INTRAVENOUS at 15:25

## 2025-07-02 RX ADMIN — SODIUM CHLORIDE: 9 INJECTION, SOLUTION INTRAVENOUS at 15:37

## 2025-07-02 RX ADMIN — PHENYLEPHRINE HYDROCHLORIDE 40 MCG/MIN: 10 INJECTION INTRAVENOUS at 15:40

## 2025-07-02 RX ADMIN — FENTANYL CITRATE 25 MCG: 50 INJECTION INTRAMUSCULAR; INTRAVENOUS at 15:54

## 2025-07-02 RX ADMIN — SODIUM CHLORIDE: 9 INJECTION, SOLUTION INTRAVENOUS at 14:05

## 2025-07-02 RX ADMIN — CEFAZOLIN 2 G: 330 INJECTION, POWDER, FOR SOLUTION INTRAMUSCULAR; INTRAVENOUS at 15:41

## 2025-07-02 RX ADMIN — PROPOFOL 60 MCG/KG/MIN: 10 INJECTION, EMULSION INTRAVENOUS at 15:25

## 2025-07-02 NOTE — ANESTHESIA PRE PROCEDURE
WITH STENT PLACEMENT      CORONARY ARTERY BYPASS GRAFT      EP DEVICE PROCEDURE N/A 04/01/2024    Remove & replace ICD biv multi leads performed by Radha Quinones MD at [facility] CARDIAC CATH LAB    PACEMAKER      PTCA      UMBILICAL HERNIA REPAIR         Social History:    Social History     Tobacco Use    Smoking status: Every Day     Current packs/day: 0.50     Average packs/day: 0.5 packs/day for 17.5 years (8.7 ttl pk-yrs)     Types: Cigarettes     Start date: 1/1/2008    Smokeless tobacco: Never   Substance Use Topics    Alcohol use: Yes     Alcohol/week: 1.0 standard drink of alcohol     Types: 1 Cans of beer per week     Comment: occassionally                                Ready to quit: Not Answered  Counseling given: Not Answered      Vital Signs (Current):   Vitals:    07/02/25 1352   Weight: 93.4 kg (206 lb)   Height: 1.88 m (6' 2\")                                              BP Readings from Last 3 Encounters:   06/27/25 96/64   05/28/25 118/82   05/25/25 124/78       NPO Status: Time of last liquid consumption: 0700                        Time of last solid consumption: 2100                        Date of last liquid consumption: 07/01/25                        Date of last solid food consumption: 07/01/25    BMI:   Wt Readings from Last 3 Encounters:   07/02/25 93.4 kg (206 lb)   06/27/25 94.8 kg (209 lb)   05/28/25 95.3 kg (210 lb)     Body mass index is 26.45 kg/m².    CBC:   Lab Results   Component Value Date/Time    WBC 9.7 06/27/2025 10:01 AM    RBC 5.12 06/27/2025 10:01 AM    HGB 15.5 06/27/2025 10:01 AM    HCT 47.8 06/27/2025 10:01 AM    MCV 93.4 06/27/2025 10:01 AM    RDW 14.6 06/27/2025 10:01 AM     06/27/2025 10:01 AM       CMP:   Lab Results   Component Value Date/Time     06/27/2025 10:01 AM    K 4.5 06/27/2025 10:01 AM     06/27/2025 10:01 AM    CO2 28 06/27/2025 10:01 AM    BUN 23 06/27/2025 10:01 AM    CREATININE 1.63 06/27/2025 10:01 AM    AGRATIO 1.1 02/21/2023 11:44 AM

## 2025-07-02 NOTE — PROCEDURES
RV ICD Lead Implantation and Biventricular ICD generator replacement    Procedure Date: 07/02/25  Lab Physician: Radha Quinones MD, [facility], [facility]    INDICATIONS:  70 yo gentleman previously lived in [place] and followed at [facility], moved to [place] to be closer to daughter and granddaughter. History of CAD and iCMP with LVEF of 20-25%. Has a history of VT s/p prior VT ablation on 7/2020 with ICD implantation on 1/10/2008. History of CRT-D upgrade in 2/22/2017 and subsequent gen change in 4/1/24. He presented to the hospital in 5/2025 with syncope. Interrogation demonstrated abnormal ventricular oversensing from RA pacing and cross talk, ventricular safety pacing occurred but lead to inappropriate ventricular inhibition and pauses. Due to abnormal sensing from RV lead leading to pauses and syncope and concern for RV Riata ICD lead dysfunction, patient was referred for new RV lead implantation.     COMMENTS:  After informed consent was obtained, the patient was brought to the electrophysiology laboratory in the fasting state, and was prepped and draped in the usual sterile fashion. IV antibiotic was administered prophylactically. MAC sedation was administered by anesthesia staff independent of those performing the procedure under my supervision with intermittent dosing of anxiolytics and narcotics.    Ultrasound-guided Access  Local anesthetic was delivered to the left pectoral region and an incision was made in the left deltopectoral groove. The old CRT-D generator was explanted from the pocket. Venogram was performed on the left which demonstrated occluded underlying venous system. There was a distal flow beyond the point of collateralization of the veins. The only options are laser lead extraction vs. Distal access. The subclavia vein was accessed using a micropuncture needle with fluoroscopic guidance medially beyond the point of occlusion.  The vein was cannulated and a retaining wire was placed in the IVC

## 2025-07-02 NOTE — PROGRESS NOTES
Cardiac Cath Lab Procedure Area Arrival Note:    Toni Montiel arrived to Cardiac Cath Lab, Procedure Area. Patient identifiers verified with NAME and DATE OF BIRTH. Procedure verified with patient. Consent forms verified. Allergies verified. Patient informed of procedure and plan of care. Questions answered with review. Patient voiced understanding of procedure and plan of care.    Patient on cardiac monitor, non-invasive blood pressure, SPO2 monitor.      Patient medicated during procedure with orders obtained and verified by Dr. Quinones.    Refer to patients Cardiac Cath Lab PROCEDURE REPORT and Anesthesia for vital signs, assessment, status, and response during procedure, printed at end of case. Printed report on chart or scanned into chart.

## 2025-07-02 NOTE — PROGRESS NOTES
Ambulated patient to the bathroom with a steady gait with standby assist, voided without difficulty. Returned to stretcher. Vital signs stable.     Site is clean, dry, and intact. No bleeding, no hematoma.     Assisted patient in dressing/Patient dressed self.   Educated patient about their sedation precautions such as not driving, operating any machinery, or signing legal documents 24 hours post procedure.     Reviewed discharge instructions, including medications, and site care, using the teach back method. Answered all questions. Verbalized understanding.     Removed peripheral IV    Escorted to discharge area in a wheelchair with all of their belongings including shoes, pants, shirts, undergarments, phones    relative present to take patient home. Reviewed discharge instructions with relative. Verbalized understanding.

## 2025-07-02 NOTE — DISCHARGE INSTRUCTIONS
able to shower.     DISCHARGE PRECAUTIONS      You can have an MRI after 6 weeks.  You must be aware that any strong magnet or magnetic field can affect your ICD.  In general, be careful of metal detectors, heavy machinery, and any area where arc-welding is performed.  When approaching a security checkpoint show your Pacemaker ID Card to security personnel.    Always tell your doctor or dentist that you have an ICD.  In some cases, antibiotics may be prescribed before certain procedures.    Your temporary identification will be given to you with these instructions.  Keep your ICD card in your wallet or on your person at all times.  You should receive your permanent card, although this may take up to 8-12 weeks.  If you do not receive your permanent card, please call the office at [phone] or the phone number provided on your temporary card for the ICD company.       SYMPTOMS THAT NEED TO BE REPORTED IMMEDIATELY      Temperature more than 100.4 F    Redness or warmth at the incision site, or pain for longer than the first 5 days after the implant.    Drainage from the incision site.    Swelling around the incision site.    Shortness of breath.    Rapid heart rate or palpitations.    Dizziness, lightheadedness, fainting.    Slow pulse below 40 beats per minute.    REMEMBER: If you feel something is an emergency or cannot be handled over the phone, call 911 or go to the closest emergency room.    WHAT TO DO IF YOUR ICD DELIVERS A SHOCK     If your ICD delivers a shock, you should seek attention at the nearest emergency room, call our office or call 911.      FOLLOW UP CARE     Follow-up in the Device Clinic on 7/17/2025 at 9:00 am.   Follow-up with HUGO Lebron on 10/6/2025 at 1:00 pm.    Future Appointments   Date Time Provider Department Center   7/17/2025  9:00 AM PAC[facility]AKER[facility]EC[facility] AMB   8/1/2025  8:00 AM [facility]   10/6/2025  1:00 PM PACEMAKERNIECY AMB

## 2025-07-02 NOTE — PROGRESS NOTES
Cardiac Cath Lab Recovery Arrival Note:      Toni Montiel arrived to Cardiac Cath Lab, Recovery Area. Staff introduced to patient. Patient identifiers verified with NAME and DATE OF BIRTH. Procedure verified with patient. Consent forms reviewed and signed by patient or authorized representative and verified. Allergies verified.     Patient and family oriented to department. Patient and family informed of procedure and plan of care.     Questions answered with review. Patient prepped for procedure, per orders from physician, prior to arrival.    Patient on cardiac monitor, non-invasive blood pressure, SPO2 monitor. On Room Air. Patient is A&Ox 4. Patient reports No pain.     Patient in stretcher, in low position, with side rails up, call bell within reach, patient instructed to call if assistance as needed.    Patient prep in: [facility] Recovery Area, [place] FT 2.   Patient family: Monica / [phone]  Family in: Waiting Room .   Prep by: Kandi Vásquez RN

## 2025-07-02 NOTE — ANESTHESIA POSTPROCEDURE EVALUATION
Department of Anesthesiology  Postprocedure Note    Patient: Toni Montiel  MRN: [id number]  YOB: 1955  Date of evaluation: 7/2/2025    Procedure Summary       Date: 07/02/25 Room / Location: [facility] CATH LAB 2 / [facility] CARDIAC CATH LAB    Anesthesia Start: 1458 Anesthesia Stop: 1659    Procedures:       Insert ICD single      Venogram upper ext left      Ultrasound guided vascular access Diagnosis:       Pacemaker lead failure      (Pacemaker lead failure [T82.110A])    Providers: Radha Quinones MD Responsible Provider: Jesus López MD    Anesthesia Type: MAC ASA Status: 3            Anesthesia Type: MAC    Laurence Phase I: Laurence Score: 10    Laurence Phase II: Laurence Score: 8    Anesthesia Post Evaluation    Patient location during evaluation: PACU  Patient participation: complete - patient participated  Level of consciousness: awake  Airway patency: patent  Nausea & Vomiting: no nausea  Cardiovascular status: hemodynamically stable  Respiratory status: acceptable  Hydration status: stable  Pain management: adequate    There were no known notable events for this encounter.

## 2025-07-02 NOTE — PROGRESS NOTES
TRANSFER - IN Cath Lab RR REPORT:    Verbal report received from Stacie RN & CRNA on Toni Montiel  being received from the EP Lab for routine progression of care. Report consisted of patient’s Situation, Background, Assessment and Recommendations(SBAR). Procedural summary and MAR reviewed with receiving nurse. Opportunity for questions and clarification was provided. Assessment completed upon patient’s arrival to Cardiac Cath Lab RECOVERY AREA and care assumed.       Cardiac Cath Lab Recovery Arrival Note:    Toni Montiel arrived to CCL recovery area.  Patient procedure= ICD placement. Patient on cardiac monitor, non-invasive blood pressure, SPO2 monitor. On O2 @ 3lpm via nasal cannula.   Patient is A&Ox 4. Patient reports no complaints of pain.    PROCEDURE SITE CHECK:    Procedure site: No bleeding and no hematoma, no pain/discomfort reported at procedure site.     No change in patient status. Continue to monitor patient and status.

## 2025-07-02 NOTE — PROGRESS NOTES
EP/Cath LAB to Recovery Room Report    Procedure: Biventricular ICD    MD: AJAY Quinones MD    Verbal Report given to Recovery Nurse on patient being transferred to Recovery Room for routine post-op. Patient stable upon transfer to .  Pt had MAC sedation with Anesthesia team, who managed MAR, vitals, and airway. Vitals, mental status, MAR, procedural summary discussed with recovery RN.     Procedural Site:    Incision site made to left chest.   Pacemaker Mode set to DDDR.60-130bpm.

## 2025-07-03 ENCOUNTER — TELEPHONE (OUTPATIENT)
Age: 70
End: 2025-07-03

## 2025-07-03 NOTE — TELEPHONE ENCOUNTER
Verified patient with two types of identifiers. Patient states he is doing well post procedure. He states he is sore but similar to previous experience. Reminded that he can use ice and tylenol to help alleviate some of the discomfort. He states he has been using tylenol which has helped. Reminded of wound and device check. Advised patient to call with questions or concerns prior to follow up. Patient verbalized understanding and will call with any other questions.      Future Appointments   Date Time Provider Department Center   7/17/2025  9:00 AM PACEMAKER, [facility] AMB   8/1/2025  8:00 AM Bello Durham MD [facility]   10/6/2025  1:00 PM PACEMAKER, [facility] AMB   10/6/2025  1:20 PM Vi Gomez APRN - NP [facility] AMB   1/16/2026  8:40 AM PACEMAKER, [facility] AMB   1/16/2026  9:00 AM Chari Underwood APRN - NP [facility] AMB

## 2025-07-17 ENCOUNTER — PROCEDURE VISIT (OUTPATIENT)
Age: 70
End: 2025-07-17

## 2025-07-17 DIAGNOSIS — D89.2 SERUM GAMMAGLOBULIN INCREASED: ICD-10-CM

## 2025-07-17 DIAGNOSIS — E78.2 MIXED HYPERLIPIDEMIA: ICD-10-CM

## 2025-07-17 DIAGNOSIS — Z95.810 BIVENTRICULAR ICD (IMPLANTABLE CARDIOVERTER-DEFIBRILLATOR) IN PLACE: Primary | ICD-10-CM

## 2025-07-17 DIAGNOSIS — N18.31 STAGE 3A CHRONIC KIDNEY DISEASE (HCC): ICD-10-CM

## 2025-07-17 DIAGNOSIS — I70.1 RENAL ARTERY STENOSIS: ICD-10-CM

## 2025-07-17 DIAGNOSIS — R73.03 PREDIABETES: ICD-10-CM

## 2025-07-17 LAB
ALBUMIN SERPL-MCNC: 3.4 G/DL (ref 3.5–5)
ALBUMIN/GLOB SERPL: 0.9 (ref 1.1–2.2)
ALP SERPL-CCNC: 81 U/L (ref 45–117)
ALT SERPL-CCNC: 20 U/L (ref 12–78)
ANION GAP SERPL CALC-SCNC: 5 MMOL/L (ref 2–12)
AST SERPL-CCNC: 13 U/L (ref 15–37)
BILIRUB SERPL-MCNC: 0.6 MG/DL (ref 0.2–1)
BUN SERPL-MCNC: 20 MG/DL (ref 6–20)
BUN/CREAT SERPL: 13 (ref 12–20)
CALCIUM SERPL-MCNC: 9.5 MG/DL (ref 8.5–10.1)
CHLORIDE SERPL-SCNC: 107 MMOL/L (ref 97–108)
CO2 SERPL-SCNC: 28 MMOL/L (ref 21–32)
CREAT SERPL-MCNC: 1.58 MG/DL (ref 0.7–1.3)
CREAT UR-MCNC: 176 MG/DL
ERYTHROCYTE [DISTWIDTH] IN BLOOD BY AUTOMATED COUNT: 14.9 % (ref 11.5–14.5)
EST. AVERAGE GLUCOSE BLD GHB EST-MCNC: 137 MG/DL
GLOBULIN SER CALC-MCNC: 3.8 G/DL (ref 2–4)
GLUCOSE SERPL-MCNC: 110 MG/DL (ref 65–100)
HBA1C MFR BLD: 6.4 % (ref 4–5.6)
HCT VFR BLD AUTO: 47.9 % (ref 36.6–50.3)
HGB BLD-MCNC: 15 G/DL (ref 12.1–17)
MCH RBC QN AUTO: 30.2 PG (ref 26–34)
MCHC RBC AUTO-ENTMCNC: 31.3 G/DL (ref 30–36.5)
MCV RBC AUTO: 96.4 FL (ref 80–99)
MICROALBUMIN UR-MCNC: 21 MG/DL
MICROALBUMIN/CREAT UR-RTO: 119 MG/G (ref 0–30)
NRBC # BLD: 0 K/UL (ref 0–0.01)
NRBC BLD-RTO: 0 PER 100 WBC
PLATELET # BLD AUTO: 149 K/UL (ref 150–400)
POTASSIUM SERPL-SCNC: 4.7 MMOL/L (ref 3.5–5.1)
PROT SERPL-MCNC: 7.2 G/DL (ref 6.4–8.2)
RBC # BLD AUTO: 4.97 M/UL (ref 4.1–5.7)
SODIUM SERPL-SCNC: 140 MMOL/L (ref 136–145)
SPECIMEN HOLD: NORMAL
WBC # BLD AUTO: 8.2 K/UL (ref 4.1–11.1)

## 2025-07-17 NOTE — PROGRESS NOTES
Cardiac Electrophysiology Wound Check Note      Wound Check   Patient is here for wound check s/p RV ICD Lead Implantation and Biventricular ICD generator replacement.      ASSESSMENT and PLAN   The incision is healing without redness, drainage. Swelling noted at site. Patient advised to continue to monitor. Otherwise well approximated with dermabond intact.  He will continue arms restrictions until at least 4 weeks post implant.  Device Checked in office.     Follow-up Disposition:  Return 3 months or as scheduled.

## 2025-07-18 LAB
CHOLEST SERPL-MCNC: 136 MG/DL (ref 100–199)
HDL SERPL-SCNC: 20.5 UMOL/L
HDLC SERPL-MCNC: 26 MG/DL
LDL SERPL QN: 20.1 NM
LDL SERPL-SCNC: 1147 NMOL/L
LDL SMALL SERPL-SCNC: 737 NMOL/L
LDLC SERPL CALC-MCNC: 81 MG/DL (ref 0–99)
LP-IR SCORE SERPL: 72
TRIGL SERPL-MCNC: 165 MG/DL (ref 0–149)

## 2025-07-21 LAB
ALBUMIN SERPL ELPH-MCNC: 3.3 G/DL (ref 2.9–4.4)
ALBUMIN/GLOB SERPL: 0.9 (ref 0.7–1.7)
ALPHA1 GLOB SERPL ELPH-MCNC: 0.3 G/DL (ref 0–0.4)
ALPHA2 GLOB SERPL ELPH-MCNC: 0.9 G/DL (ref 0.4–1)
B-GLOBULIN SERPL ELPH-MCNC: 1.1 G/DL (ref 0.7–1.3)
GAMMA GLOB SERPL ELPH-MCNC: 1.6 G/DL (ref 0.4–1.8)
GLOBULIN SER-MCNC: 3.9 G/DL (ref 2.2–3.9)
IGA SERPL-MCNC: 186 MG/DL (ref 61–437)
IGG SERPL-MCNC: 1631 MG/DL (ref 603–1613)
IGM SERPL-MCNC: 246 MG/DL (ref 20–172)
INTERPRETATION SERPL IEP-IMP: ABNORMAL
KAPPA LC FREE SER-MCNC: 95.6 MG/L (ref 3.3–19.4)
KAPPA LC FREE/LAMBDA FREE SER: 2.74 (ref 0.26–1.65)
LAMBDA LC FREE SERPL-MCNC: 34.9 MG/L (ref 5.7–26.3)
M PROTEIN SERPL ELPH-MCNC: 0.4 G/DL
PROT SERPL-MCNC: 7.2 G/DL (ref 6–8.5)

## 2025-07-24 ENCOUNTER — OFFICE VISIT (OUTPATIENT)
Facility: CLINIC | Age: 70
End: 2025-07-24

## 2025-07-24 VITALS
RESPIRATION RATE: 18 BRPM | TEMPERATURE: 97.2 F | HEART RATE: 69 BPM | BODY MASS INDEX: 27.21 KG/M2 | OXYGEN SATURATION: 96 % | WEIGHT: 212 LBS | SYSTOLIC BLOOD PRESSURE: 115 MMHG | HEIGHT: 74 IN | DIASTOLIC BLOOD PRESSURE: 77 MMHG

## 2025-07-24 DIAGNOSIS — M21.371 RIGHT FOOT DROP: Primary | ICD-10-CM

## 2025-07-24 DIAGNOSIS — D47.2 MONOCLONAL GAMMOPATHY: ICD-10-CM

## 2025-07-24 NOTE — PROGRESS NOTES
Assessment & Plan  1. Right foot drop with sensory loss: Chronic. Numbness in right outer leg to top of foot, no pain. Exam: isolated right foot drop, numbness in superficial fibular nerve distribution, no other neurologic deficits.  - Order EMG  - Tibia-fibula x-ray  - Initiate physical therapy  - Advise foot drop splint    2. Monoclonal gammopathy of undetermined significance (MGUS): Elevated gammaglobulins, IgG, IgM, kappa light chains, M spike suggesting MGUS. Potential relation to chronic kidney disease.  - Repeat SPEP and UPEP  - Consider hematologist referral based on results    Follow-up  - Next week after x-ray    It was a pleasure seeing Mr. Toni Montiel today.  No follow-ups on file.    History of Present Illness  The patient, a 70-year-old male, presents with right leg paresthesia.    Right Leg Paresthesia  - Experiencing numbness along the tibialis anterior muscle for the past month  - Episode of syncope and subsequent fall attributed to pacemaker lead malfunction  - No history of specific ankle trauma  - Developed an ulcer in the region  - Denies experiencing muscle spasms or pain  - Numbness extends from the ankle to the foot, impairing gait and foot dorsiflexion  - No evidence of edema or muscle atrophy  - Onset of symptoms was acute    Syncope  - Briefly unconscious following the syncope  - Observed in the hospital for one day  - Pacemaker lead issue was corrected  - Continued to feel unwell post-adjustment    Recent laboratory results indicated protein abnormalities.    He is not receiving monoclonal antibody therapy, and there is no family history of hematologic disorders.    PAST SURGICAL HISTORY: Pacemaker implantation.    FAMILY HISTORY  - No family history of hematologic problems    The following portions of the patient's history were reviewed and updated as appropriate: allergies, current medications, family history, medical history, social history, surgical history and problem list.    BP

## 2025-07-24 NOTE — PROGRESS NOTES
Toni Montiel is a 70 y.o. male    Chief Complaint   Patient presents with    Leg Pain     Vitals:    07/24/25 1246 07/24/25 1257   BP: 98/62 115/77   BP Site: Left Upper Arm Left Upper Arm   Patient Position: Sitting Sitting   Pulse: 69    Resp: 18    Temp: 97.2 °F (36.2 °C)    TempSrc: Temporal    SpO2: 96%    Weight: 96.2 kg (212 lb)    Height: 1.88 m (6' 2\")          Health Maintenance Due   Topic Date Due    Hepatitis C screen  Never done    Respiratory Syncytial Virus (RSV) Pregnant or age 60 yrs+ (1 - Risk 60-74 years 1-dose series) Never done    Shingles vaccine (2 of 3) 11/09/2017    COVID-19 Vaccine (3 - 2024-25 season) 09/01/2024         \"Have you been to the ER, urgent care clinic since your last visit?  Hospitalized since your last visit?\"    NO    “Have you seen or consulted any other health care providers outside of [facility] since your last visit?”    NO

## 2025-07-25 LAB
APPEARANCE UR: CLEAR
BACTERIA URNS QL MICRO: NEGATIVE /HPF
BILIRUB UR QL: NEGATIVE
COLOR UR: ABNORMAL
EPITH CASTS URNS QL MICRO: ABNORMAL /LPF
GLUCOSE UR STRIP.AUTO-MCNC: NEGATIVE MG/DL
HGB UR QL STRIP: NEGATIVE
HYALINE CASTS URNS QL MICRO: ABNORMAL /LPF (ref 0–5)
KETONES UR QL STRIP.AUTO: NEGATIVE MG/DL
LEUKOCYTE ESTERASE UR QL STRIP.AUTO: NEGATIVE
NITRITE UR QL STRIP.AUTO: NEGATIVE
PH UR STRIP: 5.5 (ref 5–8)
PROT UR STRIP-MCNC: ABNORMAL MG/DL
RBC #/AREA URNS HPF: ABNORMAL /HPF (ref 0–5)
SP GR UR REFRACTOMETRY: 1.01 (ref 1–1.03)
SPECIMEN HOLD: NORMAL
UROBILINOGEN UR QL STRIP.AUTO: 0.2 EU/DL (ref 0.2–1)
WBC URNS QL MICRO: ABNORMAL /HPF (ref 0–4)

## 2025-07-28 ENCOUNTER — TELEPHONE (OUTPATIENT)
Age: 70
End: 2025-07-28

## 2025-07-28 ENCOUNTER — HOSPITAL ENCOUNTER (OUTPATIENT)
Facility: HOSPITAL | Age: 70
Discharge: HOME | End: 2025-07-31
Payer: MEDICARE

## 2025-07-28 DIAGNOSIS — M21.371 RIGHT FOOT DROP: ICD-10-CM

## 2025-07-28 LAB
ALBUMIN SERPL ELPH-MCNC: 3.3 G/DL (ref 2.9–4.4)
ALBUMIN/GLOB SERPL: 0.9 (ref 0.7–1.7)
ALPHA1 GLOB SERPL ELPH-MCNC: 0.3 G/DL (ref 0–0.4)
ALPHA2 GLOB SERPL ELPH-MCNC: 0.8 G/DL (ref 0.4–1)
B-GLOBULIN SERPL ELPH-MCNC: 1.1 G/DL (ref 0.7–1.3)
GAMMA GLOB SERPL ELPH-MCNC: 1.6 G/DL (ref 0.4–1.8)
GLOBULIN SER-MCNC: 3.9 G/DL (ref 2.2–3.9)
IGA SERPL-MCNC: 196 MG/DL (ref 61–437)
IGG SERPL-MCNC: 1752 MG/DL (ref 603–1613)
IGM SERPL-MCNC: 245 MG/DL (ref 20–172)
INTERPRETATION SERPL IEP-IMP: ABNORMAL
KAPPA LC FREE SER-MCNC: 83.2 MG/L (ref 3.3–19.4)
KAPPA LC FREE/LAMBDA FREE SER: 2.58 (ref 0.26–1.65)
LAMBDA LC FREE SERPL-MCNC: 32.3 MG/L (ref 5.7–26.3)
M PROTEIN SERPL ELPH-MCNC: 0.5 G/DL
PROT SERPL-MCNC: 7.2 G/DL (ref 6–8.5)

## 2025-07-28 PROCEDURE — 73590 X-RAY EXAM OF LOWER LEG: CPT

## 2025-07-28 SDOH — HEALTH STABILITY: PHYSICAL HEALTH: ON AVERAGE, HOW MANY MINUTES DO YOU ENGAGE IN EXERCISE AT THIS LEVEL?: 90 MIN

## 2025-07-28 SDOH — HEALTH STABILITY: PHYSICAL HEALTH: ON AVERAGE, HOW MANY DAYS PER WEEK DO YOU ENGAGE IN MODERATE TO STRENUOUS EXERCISE (LIKE A BRISK WALK)?: 6 DAYS

## 2025-07-28 ASSESSMENT — LIFESTYLE VARIABLES
HOW OFTEN DO YOU HAVE A DRINK CONTAINING ALCOHOL: MONTHLY OR LESS
HOW MANY STANDARD DRINKS CONTAINING ALCOHOL DO YOU HAVE ON A TYPICAL DAY: 1 OR 2
HOW OFTEN DO YOU HAVE A DRINK CONTAINING ALCOHOL: 2
HOW OFTEN DO YOU HAVE SIX OR MORE DRINKS ON ONE OCCASION: 1
HOW MANY STANDARD DRINKS CONTAINING ALCOHOL DO YOU HAVE ON A TYPICAL DAY: 1

## 2025-07-28 ASSESSMENT — PATIENT HEALTH QUESTIONNAIRE - PHQ9
SUM OF ALL RESPONSES TO PHQ QUESTIONS 1-9: 0
2. FEELING DOWN, DEPRESSED OR HOPELESS: NOT AT ALL
1. LITTLE INTEREST OR PLEASURE IN DOING THINGS: NOT AT ALL
SUM OF ALL RESPONSES TO PHQ QUESTIONS 1-9: 0

## 2025-07-29 LAB
ALBUMIN MFR UR ELPH: 59.8 %
ALPHA1 GLOB MFR UR ELPH: 3 %
ALPHA2 GLOB 24H MFR UR ELPH: 8.8 %
B-GLOBULIN 24H MFR UR ELPH: 12.7 %
GAMMA GLOB 24H MFR UR ELPH: 15.6 %
LABORATORY COMMENT REPORT: NORMAL
M PROTEIN MFR UR ELPH: NORMAL %
PROT UR-MCNC: 23.8 MG/DL

## 2025-08-01 ENCOUNTER — OFFICE VISIT (OUTPATIENT)
Facility: CLINIC | Age: 70
End: 2025-08-01

## 2025-08-01 VITALS
OXYGEN SATURATION: 97 % | BODY MASS INDEX: 26.98 KG/M2 | HEART RATE: 71 BPM | WEIGHT: 210.2 LBS | RESPIRATION RATE: 18 BRPM | TEMPERATURE: 97.5 F | SYSTOLIC BLOOD PRESSURE: 107 MMHG | HEIGHT: 74 IN | DIASTOLIC BLOOD PRESSURE: 64 MMHG

## 2025-08-01 DIAGNOSIS — Z00.00 MEDICARE ANNUAL WELLNESS VISIT, SUBSEQUENT: Primary | ICD-10-CM

## 2025-08-01 DIAGNOSIS — M21.371 RIGHT FOOT DROP: ICD-10-CM

## 2025-08-01 DIAGNOSIS — D47.2 MONOCLONAL GAMMOPATHY: ICD-10-CM

## 2025-08-01 DIAGNOSIS — Z12.5 PROSTATE CANCER SCREENING: ICD-10-CM

## 2025-08-01 DIAGNOSIS — R73.03 PREDIABETES: ICD-10-CM

## 2025-08-01 DIAGNOSIS — E78.2 MIXED HYPERLIPIDEMIA: ICD-10-CM

## 2025-08-01 DIAGNOSIS — E53.8 B12 DEFICIENCY: ICD-10-CM

## 2025-08-01 NOTE — PROGRESS NOTES
Chief Complaint   Patient presents with    Medicare AWV     1. Have you been to the ER, urgent care clinic since your last visit?  Hospitalized since your last visit?No    2. Have you seen or consulted any other health care providers outside of the [facility] since your last visit?  Include any pap smears or colon screening. No    
0.50        Years: 0.5 packs/day for 17.6 years (8.8 ttl pk-yrs)        Types: Cigarettes        Start date: 1/1/2008      Smokeless tobacco: Never     Interventions:  Patient declined any further intervention or treatment                      Objective   Vitals:    08/01/25 0758   BP: 107/64   BP Site: Left Upper Arm   Patient Position: Sitting   BP Cuff Size: Medium Adult   Pulse: 71   Resp: 18   Temp: 97.5 °F (36.4 °C)   TempSrc: Oral   SpO2: 97%   Weight: 95.3 kg (210 lb 3.2 oz)   Height: 1.88 m (6' 2\")      Body mass index is 26.99 kg/m².        Physical Exam  General Appearance: Normal.  Vital signs: Within normal limits.  HEENT: Within normal limits.  Respiratory: Within normal limits.  Skin: Warm and dry, no rash.  Neurological: Right foot drop with sensory loss in peroneal nerve distribution.            No Known Allergies  Prior to Visit Medications    Medication Sig Taking? Authorizing Provider   carvedilol (COREG) 12.5 MG tablet Take 1 tablet by mouth 2 times daily Yes Bello Durham MD   sacubitril-valsartan (ENTRESTO) 24-26 MG per tablet TAKE 1 TABLET BY MOUTH TWICE  DAILY Yes Alina Alejandro MD   rosuvastatin (CRESTOR) 40 MG tablet Take 1 tablet by mouth nightly Yes Alina Alejandro MD   PARoxetine (PAXIL) 20 MG tablet TAKE 1 TABLET BY MOUTH EVERY DAY Yes Bello Durham MD   aspirin 81 MG chewable tablet Take 1 tablet by mouth daily Yes Provider, MD Karsten       [facility] (Including outside providers/suppliers regularly involved in providing care):   Patient Care Team:  Bello Durham MD as PCP - General (Family Medicine)  Bello Durham MD as PCP - Empaneled Provider  Alina Alejandro MD as Consulting Physician (Cardiology)     Recommendations for Preventive Services Due: see orders and patient instructions/AVS.  Recommended screening schedule for the next 5-10 years is provided to the patient in written form: see Patient Instructions/AVS.     Reviewed and updated this visit:  Tobacco

## 2025-08-01 NOTE — PATIENT INSTRUCTIONS
Find a multivitamin that has methylated b12 and methylated folic acid (methylcobalamin and L-methyl-folate)    Some generally considered reputable supplement brands include Life Extension, Kp, Live Good, Pure Encapsulations, Doctor's Best and NOW.     =============================================================  Eating Healthy Foods: Care Instructions  With every meal, you can make healthy food choices. Try to eat a variety of fruits, vegetables, whole grains, lean proteins, and low-fat dairy products. This can help you get the right balance of nutrients, including vitamins and minerals. Small changes add up over time. You can start by adding one healthy food to your meals each day.    Try to make half your plate fruits and vegetables, one-fourth whole grains, and one-fourth lean proteins. Try including dairy with your meals.   Eat more fruits and vegetables. Try to have them with most meals and snacks.   Foods for healthy eating        Fruits   These can be fresh, frozen, canned, or dried.  Try to choose whole fruit rather than fruit juice.  Eat a variety of colors.        Vegetables   These can be fresh, frozen, canned, or dried.  Beans, peas, and lentils count too.        Whole grains   Choose whole-grain breads, cereals, and noodles.  Try brown rice.        Lean proteins   These can include lean meat, poultry, fish, and eggs.  You can also have tofu, beans, peas, lentils, nuts, and seeds.        Dairy   Try milk, yogurt, and cheese.  Choose low-fat or fat-free when you can.  If you need to, use lactose-free milk or fortified plant-based milk products, such as soy milk.        Water   Drink water when you're thirsty.  Limit sugar-sweetened drinks, including soda, fruit drinks, and sports drinks.  Where can you learn more?  Go to https://www.healthwise.net/patientEd and enter T756 to learn more about \"Eating Healthy Foods: Care Instructions.\"  Current as of: October 7, 2024  Content Version: 14.5  © 2824-8652

## 2025-08-08 DIAGNOSIS — I50.22 CHF (CONGESTIVE HEART FAILURE), NYHA CLASS I, CHRONIC, SYSTOLIC (HCC): ICD-10-CM

## 2025-08-08 DIAGNOSIS — I25.5 ISCHEMIC CARDIOMYOPATHY: ICD-10-CM

## 2025-08-08 RX ORDER — CARVEDILOL 12.5 MG/1
12.5 TABLET ORAL 2 TIMES DAILY
Qty: 180 TABLET | Refills: 3 | Status: SHIPPED | OUTPATIENT
Start: 2025-08-08

## 2025-08-13 ENCOUNTER — PROCEDURE VISIT (OUTPATIENT)
Age: 70
End: 2025-08-13
Payer: MEDICARE

## 2025-08-13 DIAGNOSIS — G57.31 NEUROPATHY OF RIGHT COMMON PERONEAL NERVE AT HEAD OF FIBULA: Primary | ICD-10-CM

## 2025-08-13 PROCEDURE — 95886 MUSC TEST DONE W/N TEST COMP: CPT | Performed by: PSYCHIATRY & NEUROLOGY

## 2025-08-13 PROCEDURE — 95910 NRV CNDJ TEST 7-8 STUDIES: CPT | Performed by: PSYCHIATRY & NEUROLOGY

## 2025-08-21 DIAGNOSIS — G57.31 ENTRAPMENT NEUROPATHY OF RIGHT COMMON PERONEAL NERVE: Primary | ICD-10-CM

## 2025-08-21 DIAGNOSIS — M21.371 RIGHT FOOT DROP: ICD-10-CM

## (undated) DEVICE — APPLICATOR MEDICATED 26 CC SOLUTION HI LT ORNG CHLORAPREP

## (undated) DEVICE — SNARE ENDOSCP L240CM OD24MM LOOP W10MM RND INSUL STIFF BRAID

## (undated) DEVICE — STRIP,CLOSURE,WOUND,MEDI-STRIP,1/2X4: Brand: MEDLINE

## (undated) DEVICE — SUTURE MONOCRYL STRATAFIX SPRL + SZ 4-0 L12IN ABSRB UD PS-2 SXMP1B117

## (undated) DEVICE — SUTURE PERMAHAND SZ 0 L30IN NONABSORBABLE BLK L26MM SH 1/2 K834H

## (undated) DEVICE — GUIDEWIRE VASC L80CM DIA0.018IN TIP L5CM 15DEG ANG NIT

## (undated) DEVICE — SUTURE PERMAHAND SZ 0 L18IN NONABSORBABLE BLK SILK BRAID A186H

## (undated) DEVICE — APPLICATOR MEDICATED 10.5 CC SOLUTION CLR STRL CHLORAPREP

## (undated) DEVICE — DRESSING HYDROFIBER AQUACEL AG ADVANTAGE 3.5X6 IN

## (undated) DEVICE — PADPRO DEFIBRILLATION/PACING/CARDIOVERSION/MONITORING ELECTRODES, ADULT/CHILD GREATER THAN 10 KG RADIOTRANSPARENT ELECTRODE, PHYSIO-CONTROL QUIK-COMBO (M) 60" (152 CM): Brand: PADPRO

## (undated) DEVICE — STRIP SKIN CLSR W0.25XL4IN WHT SPUNBOUND FBR NYL HI ADH

## (undated) DEVICE — SUTURE MONOCRYL + ABSORBABLE MONOFILAMENT 2-0 CT1 12 IN UD SXMP1B412

## (undated) DEVICE — INTRODUCER SHTH 0.018 IN 4 FRX40 CM KT SFT TIP NIT VSI 7266V

## (undated) DEVICE — SUTURE MNFLMNT SH 26 MM 1/2 CI CRCLE TPR PNT SYMTRC PD PLU

## (undated) DEVICE — SUTURE ABSORBABLE MONOFILAMENT 2-0 CT1 12 IN UD MONOCRYL + SXMP1B412

## (undated) DEVICE — PROVE COVER: Brand: UNBRANDED

## (undated) DEVICE — 3M™ IOBAN™ 2 ANTIMICROBIAL INCISE DRAPE 6640EZ: Brand: IOBAN™ 2

## (undated) DEVICE — SYRINGE IRRIG 60ML SFT PLIABLE BLB EZ TO GRP 1 HND USE W/

## (undated) DEVICE — LIQUIBAND RAPID ADHESIVE 36/CS 0.8ML: Brand: MEDLINE

## (undated) DEVICE — ELECTRODE PT RET AD L9FT HI MOIST COND ADH HYDRGEL CORDED

## (undated) DEVICE — INTRODUCER SHTH L13CM OD7FR SH ORNG HUB SEAMLESS SAFSHTH

## (undated) DEVICE — PACEMAKER PACK: Brand: MEDLINE INDUSTRIES, INC.

## (undated) DEVICE — LIMB HOLDER, WRIST/ANKLE: Brand: DEROYAL

## (undated) DEVICE — ELECTRODE,RADIOTRANSLUCENT,FOAM,5PK: Brand: MEDLINE

## (undated) DEVICE — DRESSING ANTIMIC FOAM OPTIFOAM POSTOP ADH 4 X 6 IN

## (undated) DEVICE — THERMOGARD PLUS ABC DUAL DISPERSIVE ELECTRODE: Brand: THERMOGARD

## (undated) DEVICE — 3M™ IOBAN™ 2 ANTIMICROBIAL INCISE DRAPE 6650EZ: Brand: IOBAN™ 2

## (undated) DEVICE — PLASMABLADE X PS210-030S-LIGHT 3.0SL: Brand: PLASMABLADE™ X